# Patient Record
Sex: MALE | Race: WHITE | NOT HISPANIC OR LATINO
[De-identification: names, ages, dates, MRNs, and addresses within clinical notes are randomized per-mention and may not be internally consistent; named-entity substitution may affect disease eponyms.]

---

## 2017-01-27 ENCOUNTER — APPOINTMENT (OUTPATIENT)
Dept: UROLOGY | Facility: CLINIC | Age: 62
End: 2017-01-27

## 2017-01-27 LAB — PSA SERPL-MCNC: 14.91 NG/ML

## 2017-03-16 ENCOUNTER — APPOINTMENT (OUTPATIENT)
Dept: UROLOGY | Facility: CLINIC | Age: 62
End: 2017-03-16

## 2017-03-23 ENCOUNTER — MEDICATION RENEWAL (OUTPATIENT)
Age: 62
End: 2017-03-23

## 2017-07-28 ENCOUNTER — APPOINTMENT (OUTPATIENT)
Dept: UROLOGY | Facility: CLINIC | Age: 62
End: 2017-07-28
Payer: COMMERCIAL

## 2017-07-28 PROCEDURE — 99213 OFFICE O/P EST LOW 20 MIN: CPT

## 2017-07-31 LAB — PSA SERPL-MCNC: 10.88 NG/ML

## 2017-09-14 ENCOUNTER — APPOINTMENT (OUTPATIENT)
Dept: UROLOGY | Facility: CLINIC | Age: 62
End: 2017-09-14

## 2018-01-22 ENCOUNTER — RX RENEWAL (OUTPATIENT)
Age: 63
End: 2018-01-22

## 2018-02-13 ENCOUNTER — APPOINTMENT (OUTPATIENT)
Dept: UROLOGY | Facility: CLINIC | Age: 63
End: 2018-02-13
Payer: COMMERCIAL

## 2018-02-13 PROCEDURE — 99214 OFFICE O/P EST MOD 30 MIN: CPT

## 2018-02-14 LAB — PSA SERPL-MCNC: 7.89 NG/ML

## 2018-02-19 ENCOUNTER — RX RENEWAL (OUTPATIENT)
Age: 63
End: 2018-02-19

## 2018-03-23 ENCOUNTER — RX RENEWAL (OUTPATIENT)
Age: 63
End: 2018-03-23

## 2018-06-18 ENCOUNTER — RX RENEWAL (OUTPATIENT)
Age: 63
End: 2018-06-18

## 2018-09-17 ENCOUNTER — RX RENEWAL (OUTPATIENT)
Age: 63
End: 2018-09-17

## 2018-10-18 ENCOUNTER — RX RENEWAL (OUTPATIENT)
Age: 63
End: 2018-10-18

## 2018-11-09 ENCOUNTER — APPOINTMENT (OUTPATIENT)
Dept: UROLOGY | Facility: CLINIC | Age: 63
End: 2018-11-09
Payer: MEDICARE

## 2018-11-09 PROCEDURE — 99214 OFFICE O/P EST MOD 30 MIN: CPT

## 2018-11-14 ENCOUNTER — MEDICATION RENEWAL (OUTPATIENT)
Age: 63
End: 2018-11-14

## 2018-11-16 LAB — PSA SERPL-MCNC: 8.46 NG/ML

## 2018-11-21 ENCOUNTER — MEDICATION RENEWAL (OUTPATIENT)
Age: 63
End: 2018-11-21

## 2018-11-26 ENCOUNTER — RX RENEWAL (OUTPATIENT)
Age: 63
End: 2018-11-26

## 2018-11-27 ENCOUNTER — MEDICATION RENEWAL (OUTPATIENT)
Age: 63
End: 2018-11-27

## 2019-02-13 ENCOUNTER — RX RENEWAL (OUTPATIENT)
Age: 64
End: 2019-02-13

## 2019-05-15 ENCOUNTER — RX RENEWAL (OUTPATIENT)
Age: 64
End: 2019-05-15

## 2019-06-24 ENCOUNTER — RX RENEWAL (OUTPATIENT)
Age: 64
End: 2019-06-24

## 2019-07-16 ENCOUNTER — NON-APPOINTMENT (OUTPATIENT)
Age: 64
End: 2019-07-16

## 2019-07-16 ENCOUNTER — APPOINTMENT (OUTPATIENT)
Dept: CARDIOLOGY | Facility: CLINIC | Age: 64
End: 2019-07-16
Payer: MEDICARE

## 2019-07-16 VITALS
WEIGHT: 213 LBS | DIASTOLIC BLOOD PRESSURE: 96 MMHG | BODY MASS INDEX: 29.82 KG/M2 | SYSTOLIC BLOOD PRESSURE: 221 MMHG | HEIGHT: 71 IN | OXYGEN SATURATION: 97 % | HEART RATE: 63 BPM

## 2019-07-16 DIAGNOSIS — I38 ENDOCARDITIS, VALVE UNSPECIFIED: ICD-10-CM

## 2019-07-16 PROCEDURE — 99205 OFFICE O/P NEW HI 60 MIN: CPT

## 2019-07-16 PROCEDURE — 93000 ELECTROCARDIOGRAM COMPLETE: CPT

## 2019-07-16 RX ORDER — ASPIRIN 81 MG
81 TABLET, DELAYED RELEASE (ENTERIC COATED) ORAL
Refills: 0 | Status: ACTIVE | COMMUNITY

## 2019-07-16 RX ORDER — METOPROLOL TARTRATE 50 MG/1
50 TABLET, FILM COATED ORAL
Refills: 3 | Status: ACTIVE | COMMUNITY

## 2019-07-16 NOTE — REASON FOR VISIT
[Initial Evaluation] : an initial evaluation of [Hypertension] : hypertension [FreeTextEntry1] : Mitral and tspid valve repair,  aortic insufficiency

## 2019-07-16 NOTE — HISTORY OF PRESENT ILLNESS
[FreeTextEntry1] : I saw Chris Webber in the office today for cardiac evaluation. I had previously seen him in 2008 when he had endocarditis and underwent repair of his mitral and tricuspid valves.  At that time he had no coronary disease. He has done well. He does have hypertension. On recent echocardiogram 7/19 he was found to have normal ejection fraction with good repair of the mitral and tricuspid valves. Trileaflet aortic valve with moderate AI. He had LVH with stage I diastolic dysfunction and dilated left atrium. The patient is asymptomatic. He had a carotid Doppler that showed no plaque.\par \par He has no history of smoking, diabetes, or family history of heart disease. His cholesterol is a little high and his blood pressure is quite variable. Apparently in your office is normal but it can be quite high. It might be white coat syndrome.\par \par He watches his diet fairly carefully and is physically active. He has no chest pain, shortness of breath, palpitation, lightheadedness\par \par ECG shows sinus rhythm with one isolated VPC. The tracing is normal

## 2019-07-16 NOTE — DISCUSSION/SUMMARY
[FreeTextEntry1] : This is a 63-year-old white male who is status post mitral and tricuspid valve repair for endocarditis. His repairs have been successful. He has moderate aortic insufficiency without symptoms. Blood pressure today is quite high but apparently he does have white coat syndrome. On his echo he has LVH with stage I diastolic dysfunction so I suspect his blood pressure is higher than should be.\par \par We did discuss lifestyle including diet, exercise, and weight control. I'm going to start him on olmesartan 10 mg once a day as a vasodilator. The patient will watch his diet more carefully. He does have a home blood pressure machine. Monitor his blood pressure once a day. I will see him in 3 weeks to recheck his pressure as well as to go over his blood pressure readings at home and to check his home machine for accuracy.\par \par This was all discussed with the patient and his wife and answered their questions.\par \par I would appreciate your  sending a copy of his most recent blood work for my review.

## 2019-07-16 NOTE — REVIEW OF SYSTEMS
[Eyeglasses] : currently wearing eyeglasses [Nocturia] : nocturia [Negative] : Gastrointestinal [Fever] : no fever [Headache] : no headache [Chills] : no chills [Feeling Fatigued] : not feeling fatigued [Blurry Vision] : no blurred vision [Joint Pain] : no joint pain [Skin: A Rash] : no rash: [Dizziness] : no dizziness [Depression] : no depression [Anxiety] : no anxiety [Excessive Thirst] : no polydipsia [Easy Bleeding] : no tendency for easy bleeding [Easy Bruising] : no tendency for easy bruising

## 2019-07-16 NOTE — PHYSICAL EXAM
[General Appearance - Well Developed] : well developed [Normal Appearance] : normal appearance [Well Groomed] : well groomed [General Appearance - Well Nourished] : well nourished [No Deformities] : no deformities [General Appearance - In No Acute Distress] : no acute distress [Normal Conjunctiva] : the conjunctiva exhibited no abnormalities [Normal Oral Mucosa] : normal oral mucosa [Normal Jugular Venous A Waves Present] : normal jugular venous A waves present [Normal Jugular Venous V Waves Present] : normal jugular venous V waves present [No Jugular Venous Whalen A Waves] : no jugular venous whalen A waves [Normal Rate] : normal [Normal S1] : normal S1 [Normal S2] : normal S2 [2+] : left 2+ [No Abnormalities] : the abdominal aorta was not enlarged and no bruit was heard [No Pitting Edema] : no pitting edema present [Respiration, Rhythm And Depth] : normal respiratory rhythm and effort [Exaggerated Use Of Accessory Muscles For Inspiration] : no accessory muscle use [Auscultation Breath Sounds / Voice Sounds] : lungs were clear to auscultation bilaterally [Bowel Sounds] : normal bowel sounds [Abdomen Soft] : soft [Abdomen Tenderness] : non-tender [Abnormal Walk] : normal gait [Gait - Sufficient For Exercise Testing] : the gait was sufficient for exercise testing [Nail Clubbing] : no clubbing of the fingernails [Cyanosis, Localized] : no localized cyanosis [Skin Color & Pigmentation] : normal skin color and pigmentation [Skin Turgor] : normal skin turgor [] : no rash [Oriented To Time, Place, And Person] : oriented to person, place, and time [Impaired Insight] : insight and judgment were intact [No Anxiety] : not feeling anxious [II] : a grade 2 [S3] : no S3 [S4] : no S4 [Right Carotid Bruit] : no bruit heard over the right carotid [Left Carotid Bruit] : no bruit heard over the left carotid [Right Femoral Bruit] : no bruit heard over the right femoral artery [Left Femoral Bruit] : no bruit heard over the left femoral artery

## 2019-08-09 ENCOUNTER — RX RENEWAL (OUTPATIENT)
Age: 64
End: 2019-08-09

## 2019-08-20 ENCOUNTER — APPOINTMENT (OUTPATIENT)
Dept: CARDIOLOGY | Facility: CLINIC | Age: 64
End: 2019-08-20
Payer: MEDICARE

## 2019-08-20 VITALS
WEIGHT: 212 LBS | HEIGHT: 71 IN | BODY MASS INDEX: 29.68 KG/M2 | DIASTOLIC BLOOD PRESSURE: 90 MMHG | HEART RATE: 59 BPM | SYSTOLIC BLOOD PRESSURE: 207 MMHG | OXYGEN SATURATION: 98 %

## 2019-08-20 PROCEDURE — 99214 OFFICE O/P EST MOD 30 MIN: CPT

## 2019-08-20 NOTE — REVIEW OF SYSTEMS
[Eyeglasses] : currently wearing eyeglasses [Nocturia] : nocturia [Negative] : Gastrointestinal [Fever] : no fever [Headache] : no headache [Chills] : no chills [Blurry Vision] : no blurred vision [Feeling Fatigued] : not feeling fatigued [Joint Pain] : no joint pain [Skin: A Rash] : no rash: [Depression] : no depression [Dizziness] : no dizziness [Anxiety] : no anxiety [Excessive Thirst] : no polydipsia [Easy Bleeding] : no tendency for easy bleeding [Easy Bruising] : no tendency for easy bruising

## 2019-08-20 NOTE — DISCUSSION/SUMMARY
[FreeTextEntry1] : The patient is feeling well without any signs or symptoms of active heart disease. He discussed his lifestyle including diet, exercise, and weight control. He'll restart the valsartan 80 mg once a day. He does have any problems or symptoms he would call me. His wife is going to start checking his blood pressure regular basis. They going to buy a new machine will bring it on his next visit.\par \par If all is well I will see him in approximately 2 weeks. We did discuss his most recent blood work including his elevated PSA. We also discussed his recent echocardiogram. I answered all their questions.

## 2019-08-20 NOTE — PHYSICAL EXAM
[General Appearance - Well Developed] : well developed [Normal Appearance] : normal appearance [Well Groomed] : well groomed [General Appearance - Well Nourished] : well nourished [No Deformities] : no deformities [General Appearance - In No Acute Distress] : no acute distress [Normal Conjunctiva] : the conjunctiva exhibited no abnormalities [Normal Oral Mucosa] : normal oral mucosa [Normal Jugular Venous A Waves Present] : normal jugular venous A waves present [Normal Jugular Venous V Waves Present] : normal jugular venous V waves present [No Jugular Venous Whalen A Waves] : no jugular venous whalen A waves [Respiration, Rhythm And Depth] : normal respiratory rhythm and effort [Exaggerated Use Of Accessory Muscles For Inspiration] : no accessory muscle use [Auscultation Breath Sounds / Voice Sounds] : lungs were clear to auscultation bilaterally [Bowel Sounds] : normal bowel sounds [Abdomen Soft] : soft [Abdomen Tenderness] : non-tender [Abnormal Walk] : normal gait [Gait - Sufficient For Exercise Testing] : the gait was sufficient for exercise testing [Nail Clubbing] : no clubbing of the fingernails [Cyanosis, Localized] : no localized cyanosis [Skin Color & Pigmentation] : normal skin color and pigmentation [Skin Turgor] : normal skin turgor [] : no rash [Oriented To Time, Place, And Person] : oriented to person, place, and time [Impaired Insight] : insight and judgment were intact [No Anxiety] : not feeling anxious [Normal Rate] : normal [Normal S1] : normal S1 [Normal S2] : normal S2 [II] : a grade 2 [2+] : left 2+ [No Abnormalities] : the abdominal aorta was not enlarged and no bruit was heard [No Pitting Edema] : no pitting edema present [S3] : no S3 [S4] : no S4 [Right Carotid Bruit] : no bruit heard over the right carotid [Left Carotid Bruit] : no bruit heard over the left carotid [Right Femoral Bruit] : no bruit heard over the right femoral artery [Left Femoral Bruit] : no bruit heard over the left femoral artery

## 2019-08-20 NOTE — HISTORY OF PRESENT ILLNESS
[FreeTextEntry1] : I saw Chris Webber in the office today for cardiac follow up. I had previously seen him in 2008 when he had endocarditis and underwent repair of his mitral and tricuspid valves.  At that time he had no coronary disease. He has done well. He does have hypertension. On recent echocardiogram 7/19 he was found to have normal ejection fraction with good repair of the mitral and tricuspid valves. Trileaflet aortic valve with moderate AI. He had LVH with stage I diastolic dysfunction and dilated left atrium. The patient is asymptomatic. He had a carotid Doppler that showed no plaque.\par \par He has no history of smoking, diabetes, or family history of heart disease. His cholesterol is a little high and his blood pressure is quite variable. Apparently in your office is normal but it can be quite high. It might be white coat syndrome.\par \par He watches his diet fairly carefully and is physically active. He has no chest pain, shortness of breath, palpitation, lightheadedness\par \par He is now watching his diet better and starting to walk. He had stopped his valsartan because was feeling dizzy. He thinks this was stress and not the medication once to retry the valsartan 80 mg once a day. His blood pressure at least in the office remains elevated.

## 2019-09-10 ENCOUNTER — APPOINTMENT (OUTPATIENT)
Dept: CARDIOLOGY | Facility: CLINIC | Age: 64
End: 2019-09-10
Payer: MEDICARE

## 2019-09-10 VITALS
HEART RATE: 59 BPM | BODY MASS INDEX: 29.12 KG/M2 | OXYGEN SATURATION: 98 % | WEIGHT: 208 LBS | HEIGHT: 71 IN | SYSTOLIC BLOOD PRESSURE: 200 MMHG | DIASTOLIC BLOOD PRESSURE: 99 MMHG

## 2019-09-10 PROCEDURE — 99214 OFFICE O/P EST MOD 30 MIN: CPT

## 2019-09-10 RX ORDER — TADALAFIL 5 MG/1
5 TABLET, FILM COATED ORAL
Refills: 0 | Status: ACTIVE | COMMUNITY

## 2019-09-10 NOTE — HISTORY OF PRESENT ILLNESS
[FreeTextEntry1] : I saw Chris Webber in the office today for cardiac follow up. I had previously seen him in 2008 when he had endocarditis and underwent repair of his mitral and tricuspid valves.  At that time he had no coronary disease. He has done well. He does have hypertension. On recent echocardiogram 7/19 he was found to have normal ejection fraction with good repair of the mitral and tricuspid valves. Trileaflet aortic valve with moderate AI. He had LVH with stage I diastolic dysfunction and dilated left atrium. The patient is asymptomatic. He had a carotid Doppler that showed no plaque.\par \par He has no history of smoking, diabetes, or family history of heart disease. His cholesterol is a little high and his blood pressure is quite variable. Apparently in your office is normal but it can be quite high. It might be white coat syndrome.\par \par He watches his diet fairly carefully and is physically active. He has no chest pain, shortness of breath, palpitation, lightheadedness\par \par He is now watching his diet better and starting to walk. He had stopped his valsartan because was feeling dizzy. He thinks this was stress and not the medication once to retry the valsartan 80 mg once a day. His blood pressure at least in the office remains elevated.Last visit he restarted the valsartan 80 mg once a day\par \par He is walking 3 miles a day without symptoms and has lost some weight. The monitor his blood pressure home and getting readings in the 130 to 140/60-70 range consistently.

## 2019-09-10 NOTE — DISCUSSION/SUMMARY
[FreeTextEntry1] : The patient is doing well without any signs or symptoms of active heart disease. He has no chest pain, shortness of breath, or palpitation. At home his blood pressure is reasonably well controlled on his present medication. In the office it is still high\par \par He'll monitor his blood pressure when asked to months. If he gets high readings he will let me know. I would see him in 2 months to recheck him and he will bring in his blood pressure machine to check for accuracy.\par \par At this time he'll stay on his present medication. We went all his medications in detail and again discussed the importance of lifestyle.

## 2019-09-10 NOTE — REVIEW OF SYSTEMS
[Eyeglasses] : currently wearing eyeglasses [Nocturia] : nocturia [Negative] : Respiratory [Fever] : no fever [Chills] : no chills [Headache] : no headache [Feeling Fatigued] : not feeling fatigued [Joint Pain] : no joint pain [Blurry Vision] : no blurred vision [Dizziness] : no dizziness [Skin: A Rash] : no rash: [Anxiety] : no anxiety [Depression] : no depression [Excessive Thirst] : no polydipsia [Easy Bleeding] : no tendency for easy bleeding [Easy Bruising] : no tendency for easy bruising

## 2019-09-10 NOTE — PHYSICAL EXAM
[General Appearance - Well Developed] : well developed [Normal Appearance] : normal appearance [Well Groomed] : well groomed [General Appearance - Well Nourished] : well nourished [No Deformities] : no deformities [General Appearance - In No Acute Distress] : no acute distress [Normal Conjunctiva] : the conjunctiva exhibited no abnormalities [Normal Oral Mucosa] : normal oral mucosa [Normal Jugular Venous A Waves Present] : normal jugular venous A waves present [Normal Jugular Venous V Waves Present] : normal jugular venous V waves present [No Jugular Venous Whalen A Waves] : no jugular venous whalen A waves [Respiration, Rhythm And Depth] : normal respiratory rhythm and effort [Auscultation Breath Sounds / Voice Sounds] : lungs were clear to auscultation bilaterally [Exaggerated Use Of Accessory Muscles For Inspiration] : no accessory muscle use [Bowel Sounds] : normal bowel sounds [Abdomen Soft] : soft [Abdomen Tenderness] : non-tender [Abnormal Walk] : normal gait [Gait - Sufficient For Exercise Testing] : the gait was sufficient for exercise testing [Nail Clubbing] : no clubbing of the fingernails [Cyanosis, Localized] : no localized cyanosis [Skin Color & Pigmentation] : normal skin color and pigmentation [] : no rash [Skin Turgor] : normal skin turgor [Oriented To Time, Place, And Person] : oriented to person, place, and time [Impaired Insight] : insight and judgment were intact [No Anxiety] : not feeling anxious [Normal Rate] : normal [Normal S1] : normal S1 [Normal S2] : normal S2 [II] : a grade 2 [2+] : left 2+ [No Abnormalities] : the abdominal aorta was not enlarged and no bruit was heard [No Pitting Edema] : no pitting edema present [S4] : no S4 [S3] : no S3 [Left Carotid Bruit] : no bruit heard over the left carotid [Right Carotid Bruit] : no bruit heard over the right carotid [Right Femoral Bruit] : no bruit heard over the right femoral artery [Left Femoral Bruit] : no bruit heard over the left femoral artery

## 2019-09-23 ENCOUNTER — RX RENEWAL (OUTPATIENT)
Age: 64
End: 2019-09-23

## 2019-10-31 ENCOUNTER — RX RENEWAL (OUTPATIENT)
Age: 64
End: 2019-10-31

## 2019-11-08 ENCOUNTER — APPOINTMENT (OUTPATIENT)
Dept: UROLOGY | Facility: CLINIC | Age: 64
End: 2019-11-08

## 2019-11-08 ENCOUNTER — MEDICATION RENEWAL (OUTPATIENT)
Age: 64
End: 2019-11-08

## 2019-11-12 ENCOUNTER — APPOINTMENT (OUTPATIENT)
Dept: CARDIOLOGY | Facility: CLINIC | Age: 64
End: 2019-11-12
Payer: MEDICARE

## 2019-11-12 VITALS
DIASTOLIC BLOOD PRESSURE: 80 MMHG | SYSTOLIC BLOOD PRESSURE: 191 MMHG | WEIGHT: 215 LBS | BODY MASS INDEX: 30.1 KG/M2 | HEIGHT: 71 IN | HEART RATE: 62 BPM | OXYGEN SATURATION: 100 %

## 2019-11-12 PROCEDURE — 99214 OFFICE O/P EST MOD 30 MIN: CPT

## 2019-11-12 NOTE — REVIEW OF SYSTEMS
[Eyeglasses] : currently wearing eyeglasses [Nocturia] : nocturia [Negative] : Gastrointestinal [Fever] : no fever [Headache] : no headache [Chills] : no chills [Feeling Fatigued] : not feeling fatigued [Joint Pain] : no joint pain [Blurry Vision] : no blurred vision [Skin: A Rash] : no rash: [Depression] : no depression [Dizziness] : no dizziness [Anxiety] : no anxiety [Easy Bruising] : no tendency for easy bruising [Excessive Thirst] : no polydipsia [Easy Bleeding] : no tendency for easy bleeding

## 2019-11-12 NOTE — DISCUSSION/SUMMARY
[FreeTextEntry1] : The patient is feeling well without any signs or symptoms of active heart disease. His gained back some weight and needs to pay attention to this. Blood pressure my office is always high but according to the page in her office, and his home readings are good. He forgot to bring in his machine today.\par \par He'll stay on his present medication. We did discuss lifestyle including diet, exercise, and weight control. He is trying to keep his weight down to about 200 pounds. I would appreciate your sending copy of his most recent bloodwork for my review. If everything is well I will see him in 4 months.

## 2019-11-12 NOTE — PHYSICAL EXAM
[Normal Appearance] : normal appearance [General Appearance - Well Developed] : well developed [Well Groomed] : well groomed [General Appearance - Well Nourished] : well nourished [Normal Conjunctiva] : the conjunctiva exhibited no abnormalities [General Appearance - In No Acute Distress] : no acute distress [No Deformities] : no deformities [Normal Oral Mucosa] : normal oral mucosa [Normal Jugular Venous A Waves Present] : normal jugular venous A waves present [No Jugular Venous Whalen A Waves] : no jugular venous whalen A waves [Normal Jugular Venous V Waves Present] : normal jugular venous V waves present [Respiration, Rhythm And Depth] : normal respiratory rhythm and effort [Exaggerated Use Of Accessory Muscles For Inspiration] : no accessory muscle use [Auscultation Breath Sounds / Voice Sounds] : lungs were clear to auscultation bilaterally [Abdomen Soft] : soft [Bowel Sounds] : normal bowel sounds [Abdomen Tenderness] : non-tender [Abnormal Walk] : normal gait [Gait - Sufficient For Exercise Testing] : the gait was sufficient for exercise testing [Cyanosis, Localized] : no localized cyanosis [Nail Clubbing] : no clubbing of the fingernails [Skin Color & Pigmentation] : normal skin color and pigmentation [Skin Turgor] : normal skin turgor [Oriented To Time, Place, And Person] : oriented to person, place, and time [] : no rash [No Anxiety] : not feeling anxious [Impaired Insight] : insight and judgment were intact [Normal S2] : normal S2 [Normal S1] : normal S1 [Normal Rate] : normal [II] : a grade 2 [2+] : left 2+ [No Pitting Edema] : no pitting edema present [No Abnormalities] : the abdominal aorta was not enlarged and no bruit was heard [S3] : no S3 [Right Carotid Bruit] : no bruit heard over the right carotid [S4] : no S4 [Left Carotid Bruit] : no bruit heard over the left carotid [Right Femoral Bruit] : no bruit heard over the right femoral artery [Left Femoral Bruit] : no bruit heard over the left femoral artery

## 2019-11-12 NOTE — HISTORY OF PRESENT ILLNESS
[FreeTextEntry1] : I saw Chris Webber in the office today for cardiac follow up. I had previously seen him in 2008 when he had endocarditis and underwent repair of his mitral and tricuspid valves.  At that time he had no coronary disease. He has done well. He does have hypertension. On recent echocardiogram 7/19 he was found to have normal ejection fraction with good repair of the mitral and tricuspid valves. Trileaflet aortic valve with moderate AI. He had LVH with stage I diastolic dysfunction and dilated left atrium. The patient is asymptomatic. He had a carotid Doppler that showed no plaque.\par \par He has no history of smoking, diabetes, or family history of heart disease. His cholesterol is a little high and his blood pressure is quite variable. Apparently in your office is normal but it can be quite high. It might be white coat syndrome.\par \par He watches his diet fairly carefully and is physically active. He has no chest pain, shortness of breath, palpitation, lightheadedness\par \par He is now watching his diet better and starting to walk. He had stopped his valsartan because was feeling dizzy. He thinks this was stress and not the medication once to retry the valsartan 80 mg once a day. His blood pressure at least in the office remains elevated.Last visit he restarted the valsartan 80 mg once a day\par \par He is walking 3 miles a day without symptoms and has lost some weight. The monitor his blood pressure home and getting readings in the 130 to 140/60-70 range consistently.His weight unfortunately is not consistent

## 2019-11-14 ENCOUNTER — LABORATORY RESULT (OUTPATIENT)
Age: 64
End: 2019-11-14

## 2019-11-15 ENCOUNTER — APPOINTMENT (OUTPATIENT)
Dept: UROLOGY | Facility: CLINIC | Age: 64
End: 2019-11-15
Payer: MEDICARE

## 2019-11-15 VITALS
DIASTOLIC BLOOD PRESSURE: 83 MMHG | WEIGHT: 215 LBS | HEART RATE: 60 BPM | SYSTOLIC BLOOD PRESSURE: 181 MMHG | HEIGHT: 71 IN | BODY MASS INDEX: 30.1 KG/M2

## 2019-11-15 PROCEDURE — 99214 OFFICE O/P EST MOD 30 MIN: CPT

## 2019-11-15 NOTE — ASSESSMENT
[FreeTextEntry1] : Continue Cialis for ED\par Continue dual therapy for BPH/LUTS\par mpMRI for elevated PSA

## 2019-11-15 NOTE — PHYSICAL EXAM
[General Appearance - Well Nourished] : well nourished [General Appearance - Well Developed] : well developed [Normal Appearance] : normal appearance [Well Groomed] : well groomed [General Appearance - In No Acute Distress] : no acute distress [Abdomen Tenderness] : non-tender [Abdomen Soft] : soft [Costovertebral Angle Tenderness] : no ~M costovertebral angle tenderness [Urethral Meatus] : meatus normal [Urinary Bladder Findings] : the bladder was normal on palpation [Penis Abnormality] : normal circumcised penis [Scrotum] : the scrotum was normal [Testes Tenderness] : no tenderness of the testes [Epididymis] : the epididymides were normal [Testes Mass (___cm)] : there were no testicular masses [Prostate Tenderness] : the prostate was not tender [Edema] : no peripheral edema [No Prostate Nodules] : no prostate nodules [] : no respiratory distress [Respiration, Rhythm And Depth] : normal respiratory rhythm and effort [Oriented To Time, Place, And Person] : oriented to person, place, and time [Exaggerated Use Of Accessory Muscles For Inspiration] : no accessory muscle use [Mood] : the mood was normal [Affect] : the affect was normal [Not Anxious] : not anxious [No Palpable Adenopathy] : no palpable adenopathy

## 2019-11-15 NOTE — HISTORY OF PRESENT ILLNESS
[FreeTextEntry1] : CC: hydrocele, PSA elevation, BPH, ED\par \par History of hydrocele; surgery; no recurrence or issues\par ED on CIalis; works well\par \par BPH/LUTS very well controlled with finasteride and tamsulosin; basically no urinary symptoms.  Alleviated with medication.  No exacerbating factors. \par \par PSA rowena from ~14 to 9.7 with 18 months of Proscar; not down as much as I would like; biopsy negative 2012 and low probability MRI 2016. \par \par \par SOCIAL nonsmoker\par SURG: hydrocele\par FAMHX negative CAP\par ROS: negative CV, RESP, GI

## 2019-11-20 ENCOUNTER — RX RENEWAL (OUTPATIENT)
Age: 64
End: 2019-11-20

## 2019-12-07 ENCOUNTER — FORM ENCOUNTER (OUTPATIENT)
Age: 64
End: 2019-12-07

## 2019-12-08 ENCOUNTER — APPOINTMENT (OUTPATIENT)
Dept: MRI IMAGING | Facility: IMAGING CENTER | Age: 64
End: 2019-12-08
Payer: MEDICARE

## 2019-12-08 ENCOUNTER — OUTPATIENT (OUTPATIENT)
Dept: OUTPATIENT SERVICES | Facility: HOSPITAL | Age: 64
LOS: 1 days | End: 2019-12-08
Payer: MEDICARE

## 2019-12-08 DIAGNOSIS — Z98.89 OTHER SPECIFIED POSTPROCEDURAL STATES: Chronic | ICD-10-CM

## 2019-12-08 DIAGNOSIS — R97.20 ELEVATED PROSTATE SPECIFIC ANTIGEN [PSA]: ICD-10-CM

## 2019-12-08 PROCEDURE — 72197 MRI PELVIS W/O & W/DYE: CPT

## 2019-12-08 PROCEDURE — 72197 MRI PELVIS W/O & W/DYE: CPT | Mod: 26

## 2019-12-18 ENCOUNTER — RX RENEWAL (OUTPATIENT)
Age: 64
End: 2019-12-18

## 2020-02-10 ENCOUNTER — RX RENEWAL (OUTPATIENT)
Age: 65
End: 2020-02-10

## 2020-02-27 ENCOUNTER — RX RENEWAL (OUTPATIENT)
Age: 65
End: 2020-02-27

## 2020-05-21 ENCOUNTER — RX RENEWAL (OUTPATIENT)
Age: 65
End: 2020-05-21

## 2020-05-28 ENCOUNTER — RX RENEWAL (OUTPATIENT)
Age: 65
End: 2020-05-28

## 2020-06-22 ENCOUNTER — APPOINTMENT (OUTPATIENT)
Dept: CARDIOLOGY | Facility: CLINIC | Age: 65
End: 2020-06-22
Payer: MEDICARE

## 2020-06-22 ENCOUNTER — NON-APPOINTMENT (OUTPATIENT)
Age: 65
End: 2020-06-22

## 2020-06-22 VITALS
SYSTOLIC BLOOD PRESSURE: 178 MMHG | HEIGHT: 71 IN | WEIGHT: 210 LBS | DIASTOLIC BLOOD PRESSURE: 91 MMHG | OXYGEN SATURATION: 99 % | BODY MASS INDEX: 29.4 KG/M2 | HEART RATE: 53 BPM

## 2020-06-22 PROCEDURE — 93000 ELECTROCARDIOGRAM COMPLETE: CPT

## 2020-06-22 PROCEDURE — 99214 OFFICE O/P EST MOD 30 MIN: CPT

## 2020-06-22 NOTE — REASON FOR VISIT
[Follow-Up - Clinic] : a clinic follow-up of [Hypertension] : hypertension [FreeTextEntry1] : Mitral and tspid valve repair,  aortic insufficiency

## 2020-06-22 NOTE — HISTORY OF PRESENT ILLNESS
[FreeTextEntry1] : I saw Chris Webber in the office today for cardiac follow up. I had seen him in 2008 when he had endocarditis and underwent repair of his mitral and tricuspid valves.  At that time he had no coronary disease. He has done well. He does have hypertension. On recent echocardiogram 7/19 he was found to have normal ejection fraction with good repair of the mitral and tricuspid valves. Trileaflet aortic valve with moderate AI. He had LVH with stage I diastolic dysfunction and dilated left atrium. The patient is asymptomatic. He had a carotid Doppler 7/19 that showed no plaque.\par \par He has no history of smoking, diabetes, or family history of heart disease. His cholesterol is a little high and his blood pressure is quite variable. Apparently in your office is normal but it can be quite high. It might be white coat syndrome.\par \par He watches his diet fairly carefully and is physically active. He has no chest pain, shortness of breath, palpitation, lightheadedness\par \par He is now watching his diet better and starting to walk. He had stopped his valsartan because was feeling dizzy.. It did not seem to help and he restarted the valsartan 80 mg once a day\par \par He is walking 3 miles a day without symptoms and has lost some weight. The monitor his blood pressure home and getting readings in the 130 to 140/60-70 range consistently.His weight unfortunately is not consistent\par \par ECG shows sinus rhythm and remains normal

## 2020-06-22 NOTE — PHYSICAL EXAM
[Normal Appearance] : normal appearance [General Appearance - Well Developed] : well developed [Well Groomed] : well groomed [General Appearance - Well Nourished] : well nourished [General Appearance - In No Acute Distress] : no acute distress [No Deformities] : no deformities [Normal Oral Mucosa] : normal oral mucosa [Normal Conjunctiva] : the conjunctiva exhibited no abnormalities [Normal Jugular Venous V Waves Present] : normal jugular venous V waves present [No Jugular Venous Whalen A Waves] : no jugular venous whalen A waves [Normal Jugular Venous A Waves Present] : normal jugular venous A waves present [Respiration, Rhythm And Depth] : normal respiratory rhythm and effort [Exaggerated Use Of Accessory Muscles For Inspiration] : no accessory muscle use [Bowel Sounds] : normal bowel sounds [Abdomen Soft] : soft [Auscultation Breath Sounds / Voice Sounds] : lungs were clear to auscultation bilaterally [Abdomen Tenderness] : non-tender [Abnormal Walk] : normal gait [Nail Clubbing] : no clubbing of the fingernails [Gait - Sufficient For Exercise Testing] : the gait was sufficient for exercise testing [Cyanosis, Localized] : no localized cyanosis [Skin Color & Pigmentation] : normal skin color and pigmentation [Skin Turgor] : normal skin turgor [Oriented To Time, Place, And Person] : oriented to person, place, and time [Impaired Insight] : insight and judgment were intact [] : no rash [No Anxiety] : not feeling anxious [Normal Rate] : normal [Normal S1] : normal S1 [Normal S2] : normal S2 [II] : a grade 2 [2+] : right 2+ [No Abnormalities] : the abdominal aorta was not enlarged and no bruit was heard [No Pitting Edema] : no pitting edema present [S3] : no S3 [S4] : no S4 [Left Carotid Bruit] : no bruit heard over the left carotid [Right Carotid Bruit] : no bruit heard over the right carotid [Right Femoral Bruit] : no bruit heard over the right femoral artery [Left Femoral Bruit] : no bruit heard over the left femoral artery

## 2020-06-22 NOTE — REVIEW OF SYSTEMS
[Nocturia] : nocturia [Eyeglasses] : currently wearing eyeglasses [Negative] : Gastrointestinal [Fever] : no fever [Headache] : no headache [Chills] : no chills [Joint Pain] : no joint pain [Feeling Fatigued] : not feeling fatigued [Blurry Vision] : no blurred vision [Dizziness] : no dizziness [Skin: A Rash] : no rash: [Easy Bleeding] : no tendency for easy bleeding [Excessive Thirst] : no polydipsia [Depression] : no depression [Anxiety] : no anxiety [Easy Bruising] : no tendency for easy bruising

## 2020-06-22 NOTE — DISCUSSION/SUMMARY
[FreeTextEntry1] : The patient continues to do well without any signs or symptoms of active heart disease. On his medication his blood pressure is reasonably well controlled. He has no chest pain, shortness of breath or palpitation.\par \par He will stay on his present medication. We did go over his recent carotid Doppler echo and I answered his  questions.  He'll followup echo in August. If all is well I will see him in 6 months.

## 2020-07-20 ENCOUNTER — APPOINTMENT (OUTPATIENT)
Dept: CARDIOLOGY | Facility: CLINIC | Age: 65
End: 2020-07-20
Payer: MEDICARE

## 2020-07-20 PROCEDURE — 93306 TTE W/DOPPLER COMPLETE: CPT

## 2020-07-22 ENCOUNTER — RX RENEWAL (OUTPATIENT)
Age: 65
End: 2020-07-22

## 2020-11-17 ENCOUNTER — RX RENEWAL (OUTPATIENT)
Age: 65
End: 2020-11-17

## 2020-12-14 ENCOUNTER — APPOINTMENT (OUTPATIENT)
Dept: CARDIOLOGY | Facility: CLINIC | Age: 65
End: 2020-12-14
Payer: MEDICARE

## 2020-12-15 ENCOUNTER — APPOINTMENT (OUTPATIENT)
Dept: CARDIOLOGY | Facility: CLINIC | Age: 65
End: 2020-12-15
Payer: MEDICARE

## 2021-01-05 ENCOUNTER — APPOINTMENT (OUTPATIENT)
Dept: CARDIOLOGY | Facility: CLINIC | Age: 66
End: 2021-01-05
Payer: MEDICARE

## 2021-01-05 VITALS
BODY MASS INDEX: 29.54 KG/M2 | HEIGHT: 71 IN | WEIGHT: 211 LBS | HEART RATE: 61 BPM | SYSTOLIC BLOOD PRESSURE: 190 MMHG | DIASTOLIC BLOOD PRESSURE: 84 MMHG | OXYGEN SATURATION: 100 %

## 2021-01-05 PROCEDURE — 99214 OFFICE O/P EST MOD 30 MIN: CPT

## 2021-01-05 NOTE — HISTORY OF PRESENT ILLNESS
[FreeTextEntry1] : I saw Chris Webber in the office today for cardiac follow up. I had seen him in 2008 when he had endocarditis and underwent repair of his mitral and tricuspid valves.  At that time he had no coronary disease. He has done well. He does have hypertension. On recent echocardiogram 7/20 he was found to have normal ejection fraction with good repair of the mitral and tricuspid valves. Trileaflet aortic valve with mild-moderate AI. He had LVH with stage I diastolic dysfunction and dilated left atrium. The patient is asymptomatic. He had a carotid Doppler 7/19 that showed no plaque.\par \par He has no history of smoking, diabetes, or family history of heart disease. His cholesterol is a little high and his blood pressure is quite variable. Apparently in your office is normal but it can be quite high. It might be white coat syndrome.\par \par He watches his diet fairly carefully and is physically active. He has no chest pain, shortness of breath, palpitation, lightheadedness\par \par He is now watching his diet better and starting to walk. He had stopped his valsartan because was feeling dizzy.. It did not seem to help and he restarted the valsartan 80 mg once a day\par \par He is walking 3 miles a day without symptoms and has lost some weight. The monitor his blood pressure home and getting readings in the 130 to 140/60-70 range consistently. Exercises in the gym 4 days a week.

## 2021-01-05 NOTE — DISCUSSION/SUMMARY
[FreeTextEntry1] : The patient continues to do well. Exercises regularly, watches his diet, and monitors his blood pressure at home which has been excellent. He will stay on his present medication.\par \par Did go over his echocardiogram. His aortic valve has a mild to moderate leak. Mitral and tricuspid valve repairs are still doing well. Overall ejection fraction is normal.\par \par If he does have any symptoms he will call me. He will continue to monitor his blood pressure on a regular basis. If his blood pressure does go up he will let me know since blood pressure is important treatment of his valvular heart disease.\par \par We did discuss his lifestyle including exercise, diet, weight control, and I answered his questions.\par \par If all is well I will see him in 6 months at which time he will schedule a followup echocardiogram. If you do blood work I would appreciate a copy for my review.

## 2021-01-05 NOTE — REVIEW OF SYSTEMS
[Fever] : no fever [Headache] : no headache [Chills] : no chills [Feeling Fatigued] : not feeling fatigued [Blurry Vision] : no blurred vision [Joint Pain] : no joint pain [Skin: A Rash] : no rash: [Dizziness] : no dizziness [Depression] : no depression [Anxiety] : no anxiety [Excessive Thirst] : no polydipsia [Easy Bleeding] : no tendency for easy bleeding [Easy Bruising] : no tendency for easy bruising

## 2021-02-17 ENCOUNTER — RX RENEWAL (OUTPATIENT)
Age: 66
End: 2021-02-17

## 2021-03-05 ENCOUNTER — APPOINTMENT (OUTPATIENT)
Dept: UROLOGY | Facility: CLINIC | Age: 66
End: 2021-03-05
Payer: MEDICARE

## 2021-03-05 VITALS
BODY MASS INDEX: 27.9 KG/M2 | TEMPERATURE: 98.5 F | HEIGHT: 72 IN | HEART RATE: 62 BPM | SYSTOLIC BLOOD PRESSURE: 172 MMHG | WEIGHT: 206 LBS | DIASTOLIC BLOOD PRESSURE: 80 MMHG

## 2021-03-05 PROCEDURE — 99214 OFFICE O/P EST MOD 30 MIN: CPT

## 2021-03-05 NOTE — ASSESSMENT
[FreeTextEntry1] : BPH, LUTS\par Continue dual therapy\par \par ED: \par daily Cialis 5mg\par \par \par Elevated PSA\par PSAD is low, very large gland, low risk MRI \par Observe \par \par Follow up in one year

## 2021-03-05 NOTE — HISTORY OF PRESENT ILLNESS
[FreeTextEntry1] : CC: hydrocele, PSA elevation, BPH, ED\par \par History of hydrocelectomy\par No related complaints\par No swelling or pain \par \par ED in past; he is on 5mg of tadalafil daily and no issues with erections for last 2-3 years \par \par BPH/LUTS \par Finasteride and tamsulosin\par Flow is "perfect" \par Lack of full ejaculation is bothersome\par Nocturia maybe 1-2x at times \par \par Elevated PSA history; biopsy negative 2012\par On finasteride\par \par 12/2019     99 grams; PIRAD2\par  \par SOCIAL nonsmoker\par SURG: hydrocele\par FAMHX negative CAP\par ROS: negative CV, RESP, GI

## 2021-03-24 ENCOUNTER — NON-APPOINTMENT (OUTPATIENT)
Age: 66
End: 2021-03-24

## 2021-04-06 ENCOUNTER — RX RENEWAL (OUTPATIENT)
Age: 66
End: 2021-04-06

## 2021-04-13 LAB — PSA SERPL-MCNC: 12.8 NG/ML

## 2021-04-27 ENCOUNTER — OUTPATIENT (OUTPATIENT)
Dept: OUTPATIENT SERVICES | Facility: HOSPITAL | Age: 66
LOS: 1 days | End: 2021-04-27
Payer: MEDICARE

## 2021-04-27 ENCOUNTER — APPOINTMENT (OUTPATIENT)
Dept: MRI IMAGING | Facility: IMAGING CENTER | Age: 66
End: 2021-04-27
Payer: MEDICARE

## 2021-04-27 ENCOUNTER — RESULT REVIEW (OUTPATIENT)
Age: 66
End: 2021-04-27

## 2021-04-27 DIAGNOSIS — Z98.89 OTHER SPECIFIED POSTPROCEDURAL STATES: Chronic | ICD-10-CM

## 2021-04-27 DIAGNOSIS — R97.20 ELEVATED PROSTATE SPECIFIC ANTIGEN [PSA]: ICD-10-CM

## 2021-04-27 DIAGNOSIS — R35.0 FREQUENCY OF MICTURITION: ICD-10-CM

## 2021-04-27 PROCEDURE — 72197 MRI PELVIS W/O & W/DYE: CPT

## 2021-04-27 PROCEDURE — 76377 3D RENDER W/INTRP POSTPROCES: CPT | Mod: 26

## 2021-04-27 PROCEDURE — G1004: CPT

## 2021-04-27 PROCEDURE — A9585: CPT

## 2021-04-27 PROCEDURE — 76377 3D RENDER W/INTRP POSTPROCES: CPT

## 2021-04-27 PROCEDURE — 72197 MRI PELVIS W/O & W/DYE: CPT | Mod: 26,MG

## 2021-05-18 ENCOUNTER — RX RENEWAL (OUTPATIENT)
Age: 66
End: 2021-05-18

## 2021-05-24 ENCOUNTER — RX RENEWAL (OUTPATIENT)
Age: 66
End: 2021-05-24

## 2021-07-06 ENCOUNTER — APPOINTMENT (OUTPATIENT)
Dept: CARDIOLOGY | Facility: CLINIC | Age: 66
End: 2021-07-06
Payer: MEDICARE

## 2021-07-06 ENCOUNTER — NON-APPOINTMENT (OUTPATIENT)
Age: 66
End: 2021-07-06

## 2021-07-06 VITALS
DIASTOLIC BLOOD PRESSURE: 88 MMHG | BODY MASS INDEX: 27.9 KG/M2 | HEIGHT: 72 IN | HEART RATE: 56 BPM | WEIGHT: 206 LBS | SYSTOLIC BLOOD PRESSURE: 185 MMHG | OXYGEN SATURATION: 98 %

## 2021-07-06 PROCEDURE — 93000 ELECTROCARDIOGRAM COMPLETE: CPT

## 2021-07-06 PROCEDURE — 99214 OFFICE O/P EST MOD 30 MIN: CPT

## 2021-07-06 NOTE — REVIEW OF SYSTEMS
[Nocturia] : nocturia [Negative] : Gastrointestinal [Joint Pain] : no joint pain [Rash] : no rash [Dizziness] : no dizziness [Depression] : no depression [Anxiety] : no anxiety [Easy Bleeding] : no tendency for easy bleeding [Easy Bruising] : no tendency for easy bruising

## 2021-07-06 NOTE — DISCUSSION/SUMMARY
[FreeTextEntry1] : The patient is feeling and doing well.  He exercises in the gym and has no chest pain, shortness of breath, palpitation.  He monitors his blood pressure least once or twice a month and gets consistently good readings.  He has good weight control, and eats a healthy diet.\par \beena Went over his blood work from last year, and his medications, I answered his questions.  He was Covid pos 3/21 following his first Pfizer vaccine.  He has not gotten the second shot yet.  Just had severe fatigue from the infection.  Is\par \par Has made a full recovery.\par \par The patient will schedule a regular stress test.  Also schedule follow-up echo towards the end of this year and then make an appointment to see me in 6 months.\par \par If he does have any symptoms or problems he would call me.  He understands the importance of antibiotic prophylaxis when he goes to the dentist.  I would appreciate a copy of his most recent blood work for my review.

## 2021-07-06 NOTE — HISTORY OF PRESENT ILLNESS
[FreeTextEntry1] : I saw Chris Webber in the office today for cardiac follow up. I had seen him in 2008 when he had endocarditis and underwent repair of his mitral and tricuspid valves.  At that time he had no coronary disease. He has done well. He does have hypertension. On  echocardiogram 7/20 he was found to have normal ejection fraction with good repair of the mitral and tricuspid valves. Trileaflet aortic valve with mild-moderate AI. He had LVH with stage I diastolic dysfunction and dilated left atrium. The patient is asymptomatic. He had a carotid Doppler 7/19 that showed no plaque.\par \par He has no history of smoking, diabetes, or family history of heart disease. His cholesterol is a little high and his blood pressure is quite variable. Apparently in your office it is normal but it can be quite high, probably white coat syndrome.\par \par He watches his diet fairly carefully and is physically active. He has no chest pain, shortness of breath, palpitation, lightheadedness\par \par He is now watching his diet better and starting to walk. He had stopped his valsartan because was feeling dizzy.. It did not seem to help and he restarted the valsartan 80 mg once a day\par \par He is walking 3 miles a day without symptoms and has lost some weight. He monitors his blood pressure home and getting readings in the 130 to 140/60-70 range consistently. Exercises in the gym 4 days a week.\par \par ECG demonstrates RSR with first-degree heart block.  No acute change.

## 2021-08-10 ENCOUNTER — RX RENEWAL (OUTPATIENT)
Age: 66
End: 2021-08-10

## 2021-08-26 ENCOUNTER — APPOINTMENT (OUTPATIENT)
Dept: CARDIOLOGY | Facility: CLINIC | Age: 66
End: 2021-08-26
Payer: MEDICARE

## 2021-08-26 PROCEDURE — 93015 CV STRESS TEST SUPVJ I&R: CPT

## 2021-12-01 ENCOUNTER — MED ADMIN CHARGE (OUTPATIENT)
Age: 66
End: 2021-12-01

## 2021-12-01 ENCOUNTER — APPOINTMENT (OUTPATIENT)
Dept: CARDIOLOGY | Facility: CLINIC | Age: 66
End: 2021-12-01
Payer: MEDICARE

## 2021-12-01 PROCEDURE — 93306 TTE W/DOPPLER COMPLETE: CPT

## 2021-12-01 RX ORDER — PERFLUTREN 6.52 MG/ML
6.52 INJECTION, SUSPENSION INTRAVENOUS
Qty: 1 | Refills: 0 | Status: COMPLETED | OUTPATIENT
Start: 2021-12-01

## 2021-12-01 RX ADMIN — PERFLUTREN MG/ML: 6.52 INJECTION, SUSPENSION INTRAVENOUS at 00:00

## 2022-01-12 ENCOUNTER — APPOINTMENT (OUTPATIENT)
Dept: CARDIOLOGY | Facility: CLINIC | Age: 67
End: 2022-01-12
Payer: MEDICARE

## 2022-01-12 VITALS
WEIGHT: 203 LBS | HEART RATE: 61 BPM | DIASTOLIC BLOOD PRESSURE: 71 MMHG | BODY MASS INDEX: 27.5 KG/M2 | SYSTOLIC BLOOD PRESSURE: 158 MMHG | OXYGEN SATURATION: 99 % | HEIGHT: 72 IN

## 2022-01-12 PROCEDURE — 99214 OFFICE O/P EST MOD 30 MIN: CPT

## 2022-01-12 NOTE — HISTORY OF PRESENT ILLNESS
[FreeTextEntry1] : I saw Chris Webber in the office today for cardiac follow up. I had seen him in 2008 when he had endocarditis and underwent repair of his mitral and tricuspid valves.  At that time he had no coronary disease. He has done well. He does have hypertension. On  echocardiogram 7/20 he was found to have normal ejection fraction with good repair of the mitral and tricuspid valves. Trileaflet aortic valve with mild-moderate AI. He had LVH with stage I diastolic dysfunction and dilated left atrium. The patient is asymptomatic. He had a carotid Doppler 7/19 that showed no plaque.\par \par He has no history of smoking, diabetes, or family history of heart disease. His cholesterol is a little high and his blood pressure is quite variable. Apparently in your office it is normal but it can be quite high, probably white coat syndrome.\par \par He watches his diet fairly carefully and is physically active. He has no chest pain, shortness of breath, palpitation, lightheadedness\par \par He is now watching his diet better and starting to walk. He had stopped his valsartan because was feeling dizzy.. It did not seem to help and he restarted the valsartan 80 mg once a day\par \par He is walking 3 miles a day without symptoms and has lost some weight. He monitors his blood pressure home and getting readings in the 130 to 140/60-70 range consistently. Exercises in the gym 4 days a week.\par \par Echocardiogram 12/21 demonstrated ejection fraction of 58%.  There was mitral and tricuspid valve repair.  Mild AI.  Moderate left atrial enlargement.  Stress test 8/21 was normal to a workload of 13 METS.  Blood work 7/21 demonstrated: A cholesterol of 208, triglycerides 77, HDL 63, and .  Hemoglobin 12.4 with hematocrit 36.6.  Normal thyroid.\par

## 2022-01-25 ENCOUNTER — LABORATORY RESULT (OUTPATIENT)
Age: 67
End: 2022-01-25

## 2022-02-02 ENCOUNTER — NON-APPOINTMENT (OUTPATIENT)
Age: 67
End: 2022-02-02

## 2022-02-04 ENCOUNTER — APPOINTMENT (OUTPATIENT)
Dept: UROLOGY | Facility: CLINIC | Age: 67
End: 2022-02-04
Payer: MEDICARE

## 2022-02-04 VITALS
HEART RATE: 50 BPM | TEMPERATURE: 97.9 F | SYSTOLIC BLOOD PRESSURE: 125 MMHG | BODY MASS INDEX: 27.5 KG/M2 | WEIGHT: 203 LBS | DIASTOLIC BLOOD PRESSURE: 72 MMHG | HEIGHT: 72 IN

## 2022-02-04 PROCEDURE — 99214 OFFICE O/P EST MOD 30 MIN: CPT

## 2022-02-04 NOTE — ASSESSMENT
[FreeTextEntry1] : BPH \par Mild LUTS\par Mild ED\par Continue medication\par \par PSA elevated\par Follow up MRI in 4/2022

## 2022-02-04 NOTE — HISTORY OF PRESENT ILLNESS
[FreeTextEntry1] : CC: hydrocele, PSA elevation, BPH, ED\par \par History of hydrocelectomy\par No related complaints\par No recurrence\par \par ED in past\par Doing well with tadalafil \par \par BPH/LUTS \par Finasteride and tamsulosin\par No complaints, flow is strong \par \par Elevated PSA history; biopsy negative 2012\par On finasteride\par PSA 9.89 \par \par 12/2019 99 grams; PIRAD2\par  4/2021  grams PIRAD2\par \par \par SOCIAL nonsmoker\par SURG: hydrocele\par FAMHX negative CAP\par ROS: negative CV, RESP, GI \par

## 2022-02-04 NOTE — PHYSICAL EXAM
[General Appearance - Well Developed] : well developed [General Appearance - Well Nourished] : well nourished [Normal Appearance] : normal appearance [Well Groomed] : well groomed [General Appearance - In No Acute Distress] : no acute distress [Edema] : no peripheral edema [] : no respiratory distress [Respiration, Rhythm And Depth] : normal respiratory rhythm and effort [Exaggerated Use Of Accessory Muscles For Inspiration] : no accessory muscle use [Abdomen Soft] : soft [Abdomen Tenderness] : non-tender [Costovertebral Angle Tenderness] : no ~M costovertebral angle tenderness [Urethral Meatus] : meatus normal [Penis Abnormality] : normal circumcised penis [Urinary Bladder Findings] : the bladder was normal on palpation [Scrotum] : the scrotum was normal [Epididymis] : the epididymides were normal [Testes Tenderness] : no tenderness of the testes [Testes Mass (___cm)] : there were no testicular masses [No Prostate Nodules] : no prostate nodules [Prostate Size ___ gm] : prostate size [unfilled] gm [No Focal Deficits] : no focal deficits [Oriented To Time, Place, And Person] : oriented to person, place, and time [Affect] : the affect was normal [Mood] : the mood was normal [Not Anxious] : not anxious [No Palpable Adenopathy] : no palpable adenopathy

## 2022-02-07 ENCOUNTER — RX RENEWAL (OUTPATIENT)
Age: 67
End: 2022-02-07

## 2022-03-31 ENCOUNTER — RX RENEWAL (OUTPATIENT)
Age: 67
End: 2022-03-31

## 2022-04-28 ENCOUNTER — APPOINTMENT (OUTPATIENT)
Dept: MRI IMAGING | Facility: IMAGING CENTER | Age: 67
End: 2022-04-28
Payer: MEDICARE

## 2022-04-28 ENCOUNTER — OUTPATIENT (OUTPATIENT)
Dept: OUTPATIENT SERVICES | Facility: HOSPITAL | Age: 67
LOS: 1 days | End: 2022-04-28
Payer: MEDICARE

## 2022-04-28 DIAGNOSIS — Z98.89 OTHER SPECIFIED POSTPROCEDURAL STATES: Chronic | ICD-10-CM

## 2022-04-28 DIAGNOSIS — R97.20 ELEVATED PROSTATE SPECIFIC ANTIGEN [PSA]: ICD-10-CM

## 2022-04-28 PROCEDURE — G1004: CPT

## 2022-04-28 PROCEDURE — A9585: CPT

## 2022-04-28 PROCEDURE — 72197 MRI PELVIS W/O & W/DYE: CPT | Mod: ME

## 2022-04-28 PROCEDURE — 72197 MRI PELVIS W/O & W/DYE: CPT | Mod: 26,ME

## 2022-05-02 ENCOUNTER — NON-APPOINTMENT (OUTPATIENT)
Age: 67
End: 2022-05-02

## 2022-05-04 ENCOUNTER — RX RENEWAL (OUTPATIENT)
Age: 67
End: 2022-05-04

## 2022-05-05 ENCOUNTER — APPOINTMENT (OUTPATIENT)
Dept: UROLOGY | Facility: CLINIC | Age: 67
End: 2022-05-05
Payer: MEDICARE

## 2022-05-05 PROCEDURE — 99214 OFFICE O/P EST MOD 30 MIN: CPT

## 2022-05-05 NOTE — ASSESSMENT
[FreeTextEntry1] : FRANKLIN LEAVITT  is a 66 year old  male with a PMHx notable for HTN, endocarditis, valvular insufficiency, and BPH who presents for elevated PSA/prostate fusion biopsy consult today. His PSA has been elevated for many years, ranging from 8.5 ng/ml - 25.6 ng/ml. His PSA in 01/2022 was 19.7 ng/ml- he is on finasteride. His previous prostate MRI's have been normal but his most recent prostate MRI (04/2022) demonstrated a PIRADS 4 lesion to the left pzpm. His PSAD is abnormal (0.19 ng/ml/cc- corrected for finasteride). He had one previous biopsy in 2012, which resulted as benign. He denies a family history of  malignancies. \par \par He understands that many men with prostate cancer will die with the disease rather than of it and we also discussed the results large multi-center American and  prostate cancer screening trials. He also understands that PSA in and of itself does not diagnose prostate cancer but only assesses risk to a certain degree. The patient understands that to definitively screen for prostate cancer, a biopsy is required and this procedure has risks, including bleeding, infection, ED and urinary retention. The patient opted to move forward with the biopsy.\par \par The patient is aware to expect hematuria x 2 weeks and up to 4 weeks of hematospermia.  There is a risk of infection albeit much lower than a transrectal approach. In some cases, patients can experience erectile dysfunction but this is usually self limiting.  Any fever/chills after the biopsy, the patient is to contact the office and go to the ER for an immediate evaluation. He has been given paper instructions outlining these items - which includes medications to avoid prior to surgery.\par \par  \par 1. CBC, BMP, PSA, Covid Test, UA UCx, EKG, Echo reports\par 2. Cardiac Clearance\par 3. TP biopsy at [facility]\par 4. Follow up 2 weeks after biopsy with his primary urologist or ourselves.\par 5. We will call with the path results once they are resulted.\par \par Follow up in office for MRI review/physical exam prior to biopsy date given.\par \par Elsi Ortega NP was present and available for consult for the entirety of this visit.\par

## 2022-05-05 NOTE — PHYSICAL EXAM
[General Appearance - Well Developed] : well developed [General Appearance - Well Nourished] : well nourished [Normal Appearance] : normal appearance [Well Groomed] : well groomed [General Appearance - In No Acute Distress] : no acute distress [Oriented To Time, Place, And Person] : oriented to person, place, and time [Affect] : the affect was normal [Mood] : the mood was normal

## 2022-05-05 NOTE — HISTORY OF PRESENT ILLNESS
[Home] : at home, [unfilled] , at the time of the visit. [Medical Office: ([facility])___] : at the medical office located in  [Spouse] : spouse [Verbal consent obtained from patient] : the patient, [unfilled] [FreeTextEntry1] : Dear Dr. Carey (Urologist)\par Dear Dr. Chivo Ricardo (PCP)\par \par Thank you so much for the referral to help care for your patient.\par \par Chief Complaint: Elevated PSA/ Prostate Fusion Biopsy \par Date of first visit: 05/05/2022\par Occupation: Business owner/ Electrical construction\par \par FRANKLIN LEAVITT  is a 66 year old  male with a PMHx notable for HTN, endocarditis, valvular insufficiency, and BPH who presents for elevated PSA/prostate fusion biopsy consult today. His PSA has been elevated for many years, ranging from 8.5 ng/ml - 25.6 ng/ml. His PSA in 01/2022 was 19.7 ng/ml- he is on finasteride. His previous prostate MRI's have been normal but his most recent prostate MRI (04/2022) demonstrated a PIRADS 4 lesion to the left pzpm. His PSAD is abnormal (0.19 ng/ml/cc- corrected for finasteride). He had one previous biopsy in 2012, which resulted as benign. He denies a family history of  malignancies. \par \par He denies frequency, urgency, and incomplete emptying. He reports some occasional intermittency and nocturia x 3 depending on how much fluids he drinks before bed. Sometimes has coffee before bed. He is on 0.4 mg tamsulosin daily, and happy with that.  \par \par He denies hematuria and dysuria. \par \par PSA History\par 01/2022- 19.78-corrected\par 03/2021- 25.6-corrected\par 11/2019- 19.4-corrected \par 11/2018- 16.8- corrected\par 02/2018- 15.6-corrected\par 07/2017- 21.6 -now on finasteride- corrected value\par 01/2017- 14.91 \par 05/2016- 14.86 \par 02/2016- 14.50\par 04/2015- 11.04\par 09/2014- 10.39 \par 02/2014-  10.73\par 11/2012- 11.01\par 01/2012- 8.59 \par \par MRI History\par MRI 04/28/2022.  101 cc prostate with a PIRADS 4 lesion measuring 11 x 8 x 9 mm to the left pzpm(series 9, image 13).  No LAD. No EPE. No Bony Lesions. The clinical implications discussed with the patient.\par \par MRI 04/27/2022.  103 cc prostate - PIRADS 2 with no MRI suspicious lesions, stable when compared with previous exams. A few small scattered pelvic lymph nodes which are unchanged, but no LAD. No EPE. No bony lesions. \par \par MRI  12/08/2019.  99 cc prostate - PIRADS 2 with no MRI suspicious lesions. No LAD. No EPE. No bony lesions. \par \par MRI 02/12/2016. 104 cc prostate- ESUR score 2 - clinically significant disease is unlikely to be present. No LAD. No EPE. No Bony Lesions. \par \par MRI 02/27/2014. 91 cc prostate - ESUR score 2 - clinically significant disease is unlikely to be present. No LAD. No EPE. No Bony Lesions. \par \par \par Biopsy History \par 02/12/2012 with Dr. Carey- 6/6 cores- benign\par \par The patient denies fevers, chills, nausea and or vomiting and no unexplained weight loss.\par \par All pertinent laboratory results and physician notes were reviewed.  Questionnaire results were discussed with patient.\par \par 05/05/2022\par IPSS 8 QOL 2\par \par \par Prostate cancer screening: the patient and I spoke at length about prostate cancer screening, its risks and its benefits. The patient has 2 (elevated PSA, age) risk factors for prostate cancer.  He understands that many men with prostate cancer will die with the disease rather than of it and we also discussed the results large multi-center American and  prostate cancer screening trials. He also understands that PSA in and of itself does not diagnose prostate cancer but only assesses risk to a certain degree. The patient understands that to definitively screen for prostate cancer, a biopsy is required and this procedure has risks, including bleeding, infection, ED and urinary retention. The patient opted to proceed with a fusion biopsy.\par \par

## 2022-05-17 ENCOUNTER — NON-APPOINTMENT (OUTPATIENT)
Age: 67
End: 2022-05-17

## 2022-05-17 LAB
BASOPHILS # BLD AUTO: 0.07 K/UL
BASOPHILS NFR BLD AUTO: 1.2 %
EOSINOPHIL # BLD AUTO: 0.21 K/UL
EOSINOPHIL NFR BLD AUTO: 3.5 %
HCT VFR BLD CALC: 36.5 %
HGB BLD-MCNC: 11.3 G/DL
IMM GRANULOCYTES NFR BLD AUTO: 0.5 %
LYMPHOCYTES # BLD AUTO: 1 K/UL
LYMPHOCYTES NFR BLD AUTO: 16.7 %
MAN DIFF?: NORMAL
MCHC RBC-ENTMCNC: 25.1 PG
MCHC RBC-ENTMCNC: 31 GM/DL
MCV RBC AUTO: 81.1 FL
MONOCYTES # BLD AUTO: 0.67 K/UL
MONOCYTES NFR BLD AUTO: 11.2 %
NEUTROPHILS # BLD AUTO: 4 K/UL
NEUTROPHILS NFR BLD AUTO: 66.9 %
PLATELET # BLD AUTO: 199 K/UL
RBC # BLD: 4.5 M/UL
RBC # FLD: 14.3 %
WBC # FLD AUTO: 5.98 K/UL

## 2022-05-18 ENCOUNTER — NON-APPOINTMENT (OUTPATIENT)
Age: 67
End: 2022-05-18

## 2022-05-18 DIAGNOSIS — R31.29 OTHER MICROSCOPIC HEMATURIA: ICD-10-CM

## 2022-05-18 LAB
ANION GAP SERPL CALC-SCNC: 13 MMOL/L
APPEARANCE: CLEAR
BACTERIA UR CULT: NORMAL
BACTERIA: NEGATIVE
BILIRUBIN URINE: NEGATIVE
BLOOD URINE: NEGATIVE
BUN SERPL-MCNC: 19 MG/DL
CALCIUM SERPL-MCNC: 9.2 MG/DL
CHLORIDE SERPL-SCNC: 101 MMOL/L
CO2 SERPL-SCNC: 25 MMOL/L
COLOR: YELLOW
CREAT SERPL-MCNC: 1.28 MG/DL
EGFR: 61 ML/MIN/1.73M2
GLUCOSE QUALITATIVE U: NEGATIVE
GLUCOSE SERPL-MCNC: 90 MG/DL
HYALINE CASTS: 4 /LPF
KETONES URINE: NORMAL
LEUKOCYTE ESTERASE URINE: NEGATIVE
MICROSCOPIC-UA: NORMAL
NITRITE URINE: NEGATIVE
PH URINE: 6
POTASSIUM SERPL-SCNC: 3.8 MMOL/L
PROTEIN URINE: ABNORMAL
PSA SERPL-MCNC: 7.87 NG/ML
RED BLOOD CELLS URINE: 5 /HPF
SODIUM SERPL-SCNC: 140 MMOL/L
SPECIFIC GRAVITY URINE: 1.03
SQUAMOUS EPITHELIAL CELLS: 0 /HPF
UROBILINOGEN URINE: ABNORMAL
WHITE BLOOD CELLS URINE: 2 /HPF

## 2022-05-23 ENCOUNTER — APPOINTMENT (OUTPATIENT)
Dept: UROLOGY | Facility: CLINIC | Age: 67
End: 2022-05-23
Payer: MEDICARE

## 2022-05-23 VITALS
HEART RATE: 69 BPM | DIASTOLIC BLOOD PRESSURE: 82 MMHG | TEMPERATURE: 98.5 F | SYSTOLIC BLOOD PRESSURE: 182 MMHG | OXYGEN SATURATION: 98 %

## 2022-05-23 DIAGNOSIS — R97.20 ELEVATED PROSTATE, SPECIFIC ANTIGEN [PSA]: ICD-10-CM

## 2022-05-23 PROCEDURE — 99214 OFFICE O/P EST MOD 30 MIN: CPT

## 2022-05-24 NOTE — ASSESSMENT
[FreeTextEntry1] : FRANKLIN LEAVITT  is a 66 year old  male with a PMHx notable for HTN, endocarditis, valvular insufficiency, and BPH who presents for elevated PSA/prostate fusion biopsy consult today. His PSA has been elevated for many years, ranging from 8.5 ng/ml - 25.6 ng/ml. His PSA in 05/2022 is 15.74 ng/ml(corrected for finasteride). His previous prostate MRI's have been normal but his most recent prostate MRI (04/2022) demonstrated a PIRADS 4 lesion to the left pzpm. His PSAD is abnormal (0.15 ng/ml/cc- corrected for finasteride). He had one previous biopsy in 2012, which resulted as benign. He denies a family history of  malignancies.  \par \par He understands that many men with prostate cancer will die with the disease rather than of it and we also discussed the results large multi-center American and  prostate cancer screening trials. He also understands that PSA in and of itself does not diagnose prostate cancer but only assesses risk to a certain degree. The patient understands that to definitively screen for prostate cancer, a biopsy is required and this procedure has risks, including bleeding, infection, ED and urinary retention. The patient opted to move forward with the biopsy.\par \par The patient is aware to expect hematuria x 2 weeks and up to 4 weeks of hematospermia.  There is a risk of infection albeit much lower than a transrectal approach. In some cases, patients can experience erectile dysfunction but this is usually self limiting.  Any fever/chills after the biopsy, the patient is to contact the office and go to the ER for an immediate evaluation. He has been given paper instructions outlining these items - which includes medications to avoid prior to surgery.\par \par  \par 1. CBC, BMP, PSA, Covid Test, UA UCx, EKG, Echo reports\par 2. Cardiac Clearance\par 3. TP biopsy at [facility]\par 4. Follow up 2 weeks after biopsy with his primary urologist or ourselves.\par 5. We will call with the path results once they are resulted.\par \par Thank you very much for allowing me to assist in the care of this patient. Should you have any additional questions or concerns please do not hesitate to contact me.\par \par \par Sincerely,\par \par \par Tito Damian D.O.\par  of Urology and Radiology\par  of Urology at [facility]\par System Director for Prostate Cancer\par [address] [place] [address], 5th Floor [facility], [address]\par Phone: [phone]\par \par \par

## 2022-05-24 NOTE — ASSESSMENT
[FreeTextEntry1] : FRANKLIN LEAVITT  is a 66 year old  male with a PMHx notable for HTN, endocarditis, valvular insufficiency, and BPH who presents for elevated PSA/prostate fusion biopsy consult today. His PSA has been elevated for many years, ranging from 8.5 ng/ml - 25.6 ng/ml. His PSA in 05/2022 is 15.74 ng/ml(corrected for finasteride). His previous prostate MRI's have been normal but his most recent prostate MRI (04/2022) demonstrated a PIRADS 4 lesion to the left pzpm. His PSAD is abnormal (0.15 ng/ml/cc- corrected for finasteride). He had one previous biopsy in 2012, which resulted as benign. He denies a family history of  malignancies.  \par \par He understands that many men with prostate cancer will die with the disease rather than of it and we also discussed the results large multi-center American and  prostate cancer screening trials. He also understands that PSA in and of itself does not diagnose prostate cancer but only assesses risk to a certain degree. The patient understands that to definitively screen for prostate cancer, a biopsy is required and this procedure has risks, including bleeding, infection, ED and urinary retention. The patient opted to move forward with the biopsy.\par \par The patient is aware to expect hematuria x 2 weeks and up to 4 weeks of hematospermia.  There is a risk of infection albeit much lower than a transrectal approach. In some cases, patients can experience erectile dysfunction but this is usually self limiting.  Any fever/chills after the biopsy, the patient is to contact the office and go to the ER for an immediate evaluation. He has been given paper instructions outlining these items - which includes medications to avoid prior to surgery.\par \par  \par 1. CBC, BMP, PSA, Covid Test, UA UCx, EKG, Echo reports\par 2. Cardiac Clearance\par 3. TP biopsy at [facility]\par 4. Follow up 2 weeks after biopsy with his primary urologist or ourselves.\par 5. We will call with the path results once they are resulted.\par \par Thank you very much for allowing me to assist in the care of this patient. Should you have any additional questions or concerns please do not hesitate to contact me.\par \par \par Sincerely,\par \par \par Tito Damian D.O.\par  of Urology and Radiology\par  of Urology at [facility]\par System Director for Prostate Cancer\par [address] [place] [address], 5th Floor [facility], [address]\par Phone: [phone]\par \par \par

## 2022-05-24 NOTE — ADDENDUM
[FreeTextEntry1] : I, Dr. Damian, personally performed the evaluation and management (E/M) services for this established patient who presents today with (a) new problem(s)/exacerbation of (an) existing condition(s).  That E/M includes conducting the examination, assessing all new/exacerbated conditions, and establishing a new plan of care.  Today, my ACP, Jeannie Ahuja NP, was here to observe my evaluation and management services for this new problem/exacerbated condition to be followed going forward\par \par IRB   MR/TRUS FUSION GUIDED PROSTATE BIOPSY- AN IMPROVED WAY TO DETECT AND QUANTIFY PROSTATE CANCER\par \par Inclusion criteria have been reviewed and it has been determined that the subject has met all inclusion criteria.\par Exclusion criteria have been reviewed and it has been determined that the subject does not meet any exclusion criteria.\par \par The following was discussed during the consent process:\par ·	The fact that the study involves research\par ·	The study schedule and procedures involved\par ·	The main risks of the study, and the fact that all risks may not be known at this time\par ·	New information that may affect the subject’s willingness to continue on the study will be presented as soon as it is available\par ·	Benefits of participating\par ·	Alternatives to participating\par ·	Confidentiality \par ·	Compensation for research-related injury\par ·	Contacts for questions about the study or their rights while on the study\par ·	The fact that the subject’s participation is voluntary - they can refuse or withdraw \par at any time without penalty or loss of benefits.\par \par The subject was given ample time to ask questions prior to signing - all questions were answered to the subject’s satisfaction.\par \par Contact information for research staff was given to the subject.	\par The subject has expressed his/her willingness to participate.	\par \par Opportunity to sign the consent electronically was offered to the subject. Study team to contact the subject to assist with e-consenting though the [employer] platform. \par \par OR  \par \par Subject will sign printed consent on day of procedure.\par

## 2022-05-24 NOTE — ADDENDUM
[FreeTextEntry1] : I, Dr. Damian, personally performed the evaluation and management (E/M) services for this established patient who presents today with (a) new problem(s)/exacerbation of (an) existing condition(s).  That E/M includes conducting the examination, assessing all new/exacerbated conditions, and establishing a new plan of care.  Today, my ACP, Jeannie Ahuja NP, was here to observe my evaluation and management services for this new problem/exacerbated condition to be followed going forward\par \par IRB   MR/TRUS FUSION GUIDED PROSTATE BIOPSY- AN IMPROVED WAY TO DETECT AND QUANTIFY PROSTATE CANCER\par \par Inclusion criteria have been reviewed and it has been determined that the subject has met all inclusion criteria.\par Exclusion criteria have been reviewed and it has been determined that the subject does not meet any exclusion criteria.\par \par The following was discussed during the consent process:\par ·	The fact that the study involves research\par ·	The study schedule and procedures involved\par ·	The main risks of the study, and the fact that all risks may not be known at this time\par ·	New information that may affect the subject’s willingness to continue on the study will be presented as soon as it is available\par ·	Benefits of participating\par ·	Alternatives to participating\par ·	Confidentiality \par ·	Compensation for research-related injury\par ·	Contacts for questions about the study or their rights while on the study\par ·	The fact that the subject’s participation is voluntary - they can refuse or withdraw \par at any time without penalty or loss of benefits.\par \par The subject was given ample time to ask questions prior to signing - all questions were answered to the subject’s satisfaction.\par \par Contact information for research staff was given to the subject.	\par The subject has expressed his/her willingness to participate.	\par \par Opportunity to sign the consent electronically was offered to the subject. Study team to contact the subject to assist with e-consenting though the [employer] platform. \par \par OR  \par \par Subject will sign printed consent on day of procedure.\par

## 2022-05-24 NOTE — PHYSICAL EXAM
[General Appearance - Well Developed] : well developed [General Appearance - Well Nourished] : well nourished [Normal Appearance] : normal appearance [Well Groomed] : well groomed [General Appearance - In No Acute Distress] : no acute distress [Abdomen Soft] : soft [Abdomen Tenderness] : non-tender [Costovertebral Angle Tenderness] : no ~M costovertebral angle tenderness [Urethral Meatus] : meatus normal [Penis Abnormality] : normal circumcised penis [Urinary Bladder Findings] : the bladder was normal on palpation [Scrotum] : the scrotum was normal [Testes Tenderness] : no tenderness of the testes [Testes Mass (___cm)] : there were no testicular masses [Prostate Enlargement] : the prostate was not enlarged [Prostate Tenderness] : the prostate was not tender [No Prostate Nodules] : no prostate nodules [Edema] : no peripheral edema [] : no respiratory distress [Respiration, Rhythm And Depth] : normal respiratory rhythm and effort [Exaggerated Use Of Accessory Muscles For Inspiration] : no accessory muscle use [Oriented To Time, Place, And Person] : oriented to person, place, and time [Affect] : the affect was normal [Mood] : the mood was normal [Not Anxious] : not anxious [Normal Station and Gait] : the gait and station were normal for the patient's age [No Focal Deficits] : no focal deficits [FreeTextEntry1] : negative TYREL 5/23/2022

## 2022-05-24 NOTE — ADDENDUM
[FreeTextEntry1] : I, Dr. Damian, personally performed the evaluation and management (E/M) services for this established patient who presents today with (a) new problem(s)/exacerbation of (an) existing condition(s).  That E/M includes conducting the examination, assessing all new/exacerbated conditions, and establishing a new plan of care.  Today, my ACP, Jeannie Ahuja NP, was here to observe my evaluation and management services for this new problem/exacerbated condition to be followed going forward\par \par IRB   MR/TRUS FUSION GUIDED PROSTATE BIOPSY- AN IMPROVED WAY TO DETECT AND QUANTIFY PROSTATE CANCER\par \par Inclusion criteria have been reviewed and it has been determined that the subject has met all inclusion criteria.\par Exclusion criteria have been reviewed and it has been determined that the subject does not meet any exclusion criteria.\par \par The following was discussed during the consent process:\par ·	The fact that the study involves research\par ·	The study schedule and procedures involved\par ·	The main risks of the study, and the fact that all risks may not be known at this time\par ·	New information that may affect the subject’s willingness to continue on the study will be presented as soon as it is available\par ·	Benefits of participating\par ·	Alternatives to participating\par ·	Confidentiality \par ·	Compensation for research-related injury\par ·	Contacts for questions about the study or their rights while on the study\par ·	The fact that the subject’s participation is voluntary - they can refuse or withdraw \par at any time without penalty or loss of benefits.\par \par The subject was given ample time to ask questions prior to signing - all questions were answered to the subject’s satisfaction.\par \par Contact information for research staff was given to the subject.	\par The subject has expressed his/her willingness to participate.	\par \par Opportunity to sign the consent electronically was offered to the subject. Study team to contact the subject to assist with e-consenting though the [employer] platform. \par \par OR  \par \par Subject will sign printed consent on day of procedure.\par

## 2022-05-24 NOTE — HISTORY OF PRESENT ILLNESS
[FreeTextEntry1] : Dear Dr. Carey (Urologist)\par Dear Dr. Chivo Ricardo (PCP)\par \par Thank you so much for the referral to help care for your patient.\par \par Chief Complaint: Elevated PSA/ Prostate Fusion Biopsy \par Date of first visit: 05/05/2022\par Occupation: Business owner/ Electrical construction\par \par FRANKLIN LEAVITT  is a 66 year old  male with a PMHx notable for HTN, endocarditis, valvular insufficiency, and BPH who presents for elevated PSA/prostate fusion biopsy consult today. His PSA has been elevated for many years, ranging from 8.5 ng/ml - 25.6 ng/ml. His PSA in 05/2022 is 15.74 ng/ml(corrected for finasteride). His previous prostate MRI's have been normal but his most recent prostate MRI (04/2022) demonstrated a PIRADS 4 lesion to the left pzpm. His PSAD is abnormal (0.15 ng/ml/cc- corrected for finasteride). He had one previous biopsy in 2012, which resulted as benign. He denies a family history of  malignancies. \par \par He denies frequency, urgency, and incomplete emptying. He reports some occasional intermittency and nocturia x 3 depending on how much fluids he drinks before bed. Sometimes has coffee before bed. He is on flomax, finasteride, and cialis and happy with that.  \par \par He denies hematuria and dysuria. \par \par PSA History\par 05/05/2022- 15.74- corrected\par 01/2022- 19.78-corrected\par 03/2021- 25.6-corrected\par 11/2019- 19.4-corrected \par 11/2018- 16.8- corrected\par 02/2018- 15.6-corrected\par 07/2017- 21.6 -now on finasteride- corrected value\par 01/2017- 14.91 \par 05/2016- 14.86 \par 02/2016- 14.50\par 04/2015- 11.04\par 09/2014- 10.39 \par 02/2014-  10.73\par 11/2012- 11.01\par 01/2012- 8.59 \par \par MRI History\par MRI 04/28/2022.  101 cc prostate with a PIRADS 4 lesion measuring 11 x 8 x 9 mm to the left pzpm(series 9, image 13).  No LAD. No EPE. No Bony Lesions. The clinical implications discussed with the patient.\par \par MRI 04/27/2021.  103 cc prostate - PIRADS 2 with no MRI suspicious lesions, stable when compared with previous exams. A few small scattered pelvic lymph nodes which are unchanged, but no LAD. No EPE. No bony lesions. \par \par MRI  12/08/2019.  99 cc prostate - PIRADS 2 with no MRI suspicious lesions. No LAD. No EPE. No bony lesions. \par \par MRI 02/12/2016. 104 cc prostate- ESUR score 2 - clinically significant disease is unlikely to be present. No LAD. No EPE. No Bony Lesions. \par \par MRI 02/27/2014. 91 cc prostate - ESUR score 2 - clinically significant disease is unlikely to be present. No LAD. No EPE. No Bony Lesions. \par \par \par Biopsy History \par 02/12/2012 with Dr. Carey- 6/6 cores- benign\par \par The patient denies fevers, chills, nausea and or vomiting and no unexplained weight loss.\par \par All pertinent laboratory results and physician notes were reviewed.  Questionnaire results were discussed with patient.\par \par 05/23/2022\par IPSS 10 QOL 1\par OSCAR 25\par \par 05/05/2022\par IPSS 8 QOL 2\par \par Prostate cancer screening: the patient and I spoke at length about prostate cancer screening, its risks and its benefits. The patient has 2 (elevated PSA, age) risk factors for prostate cancer.  He understands that many men with prostate cancer will die with the disease rather than of it and we also discussed the results large multi-center American and  prostate cancer screening trials. He also understands that PSA in and of itself does not diagnose prostate cancer but only assesses risk to a certain degree. The patient understands that to definitively screen for prostate cancer, a biopsy is required and this procedure has risks, including bleeding, infection, ED and urinary retention. The patient opted to proceed with a fusion biopsy.\par \par I spent 31 minutes of non-face to face time reviewing the patient's records, chart, uploading processing MR images, transferring MR images from PACS to an independent workstation, reviewing images on independent workstation, speaking with their physician and planning their prostate biopsy and preparation of an upcoming visit for 05/23/2022 .  The imaging and planning was highly complex and took this entire time.

## 2022-05-25 ENCOUNTER — NON-APPOINTMENT (OUTPATIENT)
Age: 67
End: 2022-05-25

## 2022-05-25 ENCOUNTER — APPOINTMENT (OUTPATIENT)
Dept: CARDIOLOGY | Facility: CLINIC | Age: 67
End: 2022-05-25
Payer: MEDICARE

## 2022-05-25 VITALS
SYSTOLIC BLOOD PRESSURE: 148 MMHG | HEIGHT: 72 IN | DIASTOLIC BLOOD PRESSURE: 70 MMHG | BODY MASS INDEX: 28.04 KG/M2 | HEART RATE: 59 BPM | OXYGEN SATURATION: 99 % | WEIGHT: 207 LBS

## 2022-05-25 DIAGNOSIS — Z98.890 OTHER SPECIFIED POSTPROCEDURAL STATES: ICD-10-CM

## 2022-05-25 DIAGNOSIS — I35.1 NONRHEUMATIC AORTIC (VALVE) INSUFFICIENCY: ICD-10-CM

## 2022-05-25 PROCEDURE — 93000 ELECTROCARDIOGRAM COMPLETE: CPT

## 2022-05-25 PROCEDURE — 99214 OFFICE O/P EST MOD 30 MIN: CPT

## 2022-05-25 PROCEDURE — 93224 XTRNL ECG REC UP TO 48 HRS: CPT

## 2022-05-26 NOTE — REASON FOR VISIT
[Follow-up Visit ___] : a follow-up visit  for [unfilled] [Follow-Up - Clinic] : a clinic follow-up of [Hypertension] : hypertension [FreeTextEntry1] : Mitral and tspid valve repair,  aortic insufficiency

## 2022-05-26 NOTE — REVIEW OF SYSTEMS
[see HPI] : see HPI [Nocturia] : nocturia [Negative] : Gastrointestinal [Joint Pain] : no joint pain [Rash] : no rash [Dizziness] : no dizziness [Depression] : no depression [Anxiety] : no anxiety [Easy Bleeding] : no tendency for easy bleeding [Easy Bruising] : no tendency for easy bruising

## 2022-05-26 NOTE — HISTORY OF PRESENT ILLNESS
[Home] : at home, [unfilled] , at the time of the visit. [Medical Office: ([facility])___] : at the medical office located in  [Spouse] : spouse [Verbal consent obtained from patient] : the patient, [unfilled] [FreeTextEntry1] : I saw Chris Webber in the office today for cardiac follow up, anticipation of directed prostate fusion biopsy.. I had seen him in 2008 when he had endocarditis and underwent repair of his mitral and tricuspid valves.  At that time he had no coronary disease. He has done well. He does have hypertension. On  echocardiogram 7/20 he was found to have normal ejection fraction with good repair of the mitral and tricuspid valves. Trileaflet aortic valve with mild-moderate AI. He had LVH with stage I diastolic dysfunction and dilated left atrium. The patient is asymptomatic. He had a carotid Doppler 7/19 that showed no plaque.\par \par He has no history of smoking, diabetes, or family history of heart disease. His cholesterol is a little high and his blood pressure is quite variable. Apparently in your office it is normal but it can be quite high, probably white coat syndrome.\par \par He watches his diet fairly carefully and is physically active. He has no chest pain, shortness of breath, palpitation, lightheadedness\par \par He is walking 3 miles a day without symptoms and has lost some weight. He monitors his blood pressure home and getting readings in the 130 to 140/60-70 range consistently. Exercises in the gym 4 days a week.\par \par Echocardiogram 12/21 demonstrated ejection fraction of 58%.  There was mitral and tricuspid valve repair.  Mild AI.  Moderate left atrial enlargement.  Stress test 8/21 was normal to a workload of 13 METS.  Blood work 1/22 demonstrated: a cholesterol of 96, triglycerides 91, HDL 58, and .  Blood work 5/22 demonstrated a hemoglobin of 11.3 with hematocrit 36.5.  Normal BMP.  PSA 7.87.\par \par ECG demonstrates sinus rhythm with ventricular bigeminy.  No other acute changes.\par

## 2022-05-26 NOTE — DISCUSSION/SUMMARY
[FreeTextEntry1] : Clinically the patient is doing well.  He has good functional status and denies any chest pain, shortness of breath, or palpitation.  He is status post mitral and tricuspid valve repair in 2008 for endocarditis.  Recent echocardiogram performed 12/21 demonstrates satisfactory mitral and tricuspid repair with mild AI.  Ejection fraction 58%.  Stress test performed 8/21 was normal to workload of 13 METS.  ECG is stable with ventricular bigeminy.\par \par The patient's cardiac status is stable and he represents a satisfactory candidate for the planned prostate biopsy.  No special precautions other than routine hemodynamic monitoring should be required.  If necessary his aspirin can be held and should be restarted as soon as it is felt to be safe.  It is possible during the procedure he may have ventricular ectopy.  He is on a beta-blocker and is hemodynamically stable.  I anticipate any ventricular ectopy should not cause any problem during the procedure.

## 2022-05-27 ENCOUNTER — OUTPATIENT (OUTPATIENT)
Dept: OUTPATIENT SERVICES | Facility: HOSPITAL | Age: 67
LOS: 1 days | End: 2022-05-27
Payer: MEDICARE

## 2022-05-27 DIAGNOSIS — Z98.89 OTHER SPECIFIED POSTPROCEDURAL STATES: Chronic | ICD-10-CM

## 2022-05-27 DIAGNOSIS — Z00.8 ENCOUNTER FOR OTHER GENERAL EXAMINATION: ICD-10-CM

## 2022-05-27 PROCEDURE — 76377 3D RENDER W/INTRP POSTPROCES: CPT | Mod: 26

## 2022-05-27 PROCEDURE — C8001: CPT

## 2022-05-31 ENCOUNTER — NON-APPOINTMENT (OUTPATIENT)
Age: 67
End: 2022-05-31

## 2022-05-31 LAB — SARS-COV-2 N GENE NPH QL NAA+PROBE: NOT DETECTED

## 2022-06-01 ENCOUNTER — TRANSCRIPTION ENCOUNTER (OUTPATIENT)
Age: 67
End: 2022-06-01

## 2022-06-01 NOTE — ASU DISCHARGE PLAN (ADULT/PEDIATRIC) - ASU DC SPECIAL INSTRUCTIONSFT
You may experience mild pain at the site of the procedure. You can shower, but please keep area dry afterwards and reapply bacitracin and new bandage if needed. You may also experience blood in your urine, stool, and during ejaculation which will clear up in a few days. This is expected. You may take over-the-counter medication such as Tylenol for pain, but do not exceed 4,000 mg of Tylenol daily.

## 2022-06-01 NOTE — ASU PATIENT PROFILE, ADULT - NSICDXPASTSURGICALHX_GEN_ALL_CORE_FT
PAST SURGICAL HISTORY:  H/O mitral valve repair 3/13/2008 @ [facility]    H/O prostate biopsy 2013    Status post repair of hydrocele      PAST SURGICAL HISTORY:  H/[facility]    H/O prostate biopsy 2013    H/O tricuspid valve repair     Status post repair of hydrocele

## 2022-06-01 NOTE — ASU PATIENT PROFILE, ADULT - FALL HARM RISK - UNIVERSAL INTERVENTIONS
Bed in lowest position, wheels locked, appropriate side rails in place/Call bell, personal items and telephone in reach/Instruct patient to call for assistance before getting out of bed or chair/Non-slip footwear when patient is out of bed/[place] to call system/Physically safe environment - no spills, clutter or unnecessary equipment/Purposeful Proactive Rounding/Room/bathroom lighting operational, light cord in reach

## 2022-06-01 NOTE — ASU DISCHARGE PLAN (ADULT/PEDIATRIC) - NS MD DC FALL RISK RISK
For information on Fall & Injury Prevention, visit: [email or web address] OR  [email or web address] OR  https://www.cdc.gov/steadi/patient.html

## 2022-06-01 NOTE — ASU PATIENT PROFILE, ADULT - NSICDXPASTMEDICALHX_GEN_ALL_CORE_FT
PAST MEDICAL HISTORY:  BPH (benign prostatic hyperplasia)     History of mitral valve disease s/p repair 3/13/08    Hydrocele in adult     Hypertension     White coat hypertension as per pt     PAST MEDICAL HISTORY:  AI (aortic insufficiency) 12/2021 ejection fraction 58%    BPH (benign prostatic hyperplasia)     History of mitral valve disease repair 3/13/08    Hypertension     White coat hypertension as per pt

## 2022-06-01 NOTE — ASU DISCHARGE PLAN (ADULT/PEDIATRIC) - CARE PROVIDER_API CALL
James Damian (DO)  Urology  [address], [address] [facility] [address]  Phone: [phone]  Fax: [phone]  Follow Up Time: 2 weeks

## 2022-06-02 ENCOUNTER — OUTPATIENT (OUTPATIENT)
Dept: OUTPATIENT SERVICES | Facility: HOSPITAL | Age: 67
LOS: 1 days | Discharge: HOME | End: 2022-06-02
Payer: MEDICARE

## 2022-06-02 ENCOUNTER — RESULT REVIEW (OUTPATIENT)
Age: 67
End: 2022-06-02

## 2022-06-02 ENCOUNTER — TRANSCRIPTION ENCOUNTER (OUTPATIENT)
Age: 67
End: 2022-06-02

## 2022-06-02 ENCOUNTER — APPOINTMENT (OUTPATIENT)
Dept: UROLOGY | Facility: AMBULATORY SURGERY CENTER | Age: 67
End: 2022-06-02

## 2022-06-02 VITALS
OXYGEN SATURATION: 99 % | HEART RATE: 55 BPM | SYSTOLIC BLOOD PRESSURE: 143 MMHG | RESPIRATION RATE: 16 BRPM | DIASTOLIC BLOOD PRESSURE: 52 MMHG

## 2022-06-02 VITALS
OXYGEN SATURATION: 99 % | DIASTOLIC BLOOD PRESSURE: 58 MMHG | TEMPERATURE: 98 F | RESPIRATION RATE: 16 BRPM | WEIGHT: 202.16 LBS | HEART RATE: 73 BPM | HEIGHT: 72 IN | SYSTOLIC BLOOD PRESSURE: 173 MMHG

## 2022-06-02 DIAGNOSIS — Z98.89 OTHER SPECIFIED POSTPROCEDURAL STATES: Chronic | ICD-10-CM

## 2022-06-02 DIAGNOSIS — I49.3 VENTRICULAR PREMATURE DEPOLARIZATION: ICD-10-CM

## 2022-06-02 DIAGNOSIS — Z98.890 OTHER SPECIFIED POSTPROCEDURAL STATES: Chronic | ICD-10-CM

## 2022-06-02 PROCEDURE — 76377 3D RENDER W/INTRP POSTPROCES: CPT | Mod: 26

## 2022-06-02 PROCEDURE — 55706 BX PRST8 NDL SAT SAMPLING: CPT | Mod: 22

## 2022-06-02 PROCEDURE — 76872 US TRANSRECTAL: CPT | Mod: 26

## 2022-06-02 PROCEDURE — 55706 BX PRST8 NDL SAT SAMPLING: CPT | Mod: AS

## 2022-06-02 PROCEDURE — 88305 TISSUE EXAM BY PATHOLOGIST: CPT | Mod: 26

## 2022-06-02 RX ORDER — SODIUM CHLORIDE 9 MG/ML
1000 INJECTION, SOLUTION INTRAVENOUS
Refills: 0 | Status: DISCONTINUED | OUTPATIENT
Start: 2022-06-02 | End: 2022-06-02

## 2022-06-02 RX ORDER — ACETAMINOPHEN 500 MG
1000 TABLET ORAL ONCE
Refills: 0 | Status: COMPLETED | OUTPATIENT
Start: 2022-06-02 | End: 2022-06-02

## 2022-06-02 RX ORDER — FINASTERIDE 5 MG/1
1 TABLET, FILM COATED ORAL
Qty: 0 | Refills: 0 | DISCHARGE

## 2022-06-02 RX ORDER — TAMSULOSIN HYDROCHLORIDE 0.4 MG/1
1 CAPSULE ORAL
Qty: 0 | Refills: 0 | DISCHARGE

## 2022-06-02 RX ORDER — TADALAFIL 10 MG/1
1 TABLET, FILM COATED ORAL
Qty: 0 | Refills: 0 | DISCHARGE

## 2022-06-02 RX ORDER — INDAPAMIDE 1.25 MG
1 TABLET ORAL
Qty: 0 | Refills: 0 | DISCHARGE

## 2022-06-02 RX ORDER — LOSARTAN POTASSIUM 100 MG/1
1 TABLET, FILM COATED ORAL
Qty: 0 | Refills: 0 | DISCHARGE

## 2022-06-02 RX ORDER — FENTANYL CITRATE 50 UG/ML
25 INJECTION INTRAVENOUS
Refills: 0 | Status: DISCONTINUED | OUTPATIENT
Start: 2022-06-02 | End: 2022-06-02

## 2022-06-02 RX ORDER — ONDANSETRON 8 MG/1
4 TABLET, FILM COATED ORAL ONCE
Refills: 0 | Status: DISCONTINUED | OUTPATIENT
Start: 2022-06-02 | End: 2022-06-02

## 2022-06-02 RX ORDER — ALPRAZOLAM 0.25 MG
1 TABLET ORAL
Qty: 0 | Refills: 0 | DISCHARGE

## 2022-06-02 RX ADMIN — SODIUM CHLORIDE 100 MILLILITER(S): 9 INJECTION, SOLUTION INTRAVENOUS at 13:00

## 2022-06-02 RX ADMIN — Medication 1000 MILLIGRAM(S): at 10:44

## 2022-06-02 NOTE — BRIEF OPERATIVE NOTE - NSICDXBRIEFPROCEDURE_GEN_ALL_CORE_FT
PROCEDURES:  Biopsy of prostate by transperineal approach with integrated magnetic resonance-ultrasound guidance 02-Jun-2022 12:36:29  Karlene Bee

## 2022-06-03 ENCOUNTER — NON-APPOINTMENT (OUTPATIENT)
Age: 67
End: 2022-06-03

## 2022-06-03 PROBLEM — I35.1 NONRHEUMATIC AORTIC (VALVE) INSUFFICIENCY: Chronic | Status: ACTIVE | Noted: 2022-06-02

## 2022-06-07 LAB — SURGICAL PATHOLOGY STUDY: SIGNIFICANT CHANGE UP

## 2022-06-08 ENCOUNTER — NON-APPOINTMENT (OUTPATIENT)
Age: 67
End: 2022-06-08

## 2022-06-09 ENCOUNTER — APPOINTMENT (OUTPATIENT)
Dept: CARDIOLOGY | Facility: CLINIC | Age: 67
End: 2022-06-09
Payer: MEDICARE

## 2022-06-09 PROCEDURE — 93306 TTE W/DOPPLER COMPLETE: CPT

## 2022-06-16 ENCOUNTER — APPOINTMENT (OUTPATIENT)
Dept: CARDIOLOGY | Facility: CLINIC | Age: 67
End: 2022-06-16

## 2022-06-16 ENCOUNTER — APPOINTMENT (OUTPATIENT)
Dept: UROLOGY | Facility: CLINIC | Age: 67
End: 2022-06-16
Payer: MEDICARE

## 2022-06-16 VITALS — DIASTOLIC BLOOD PRESSURE: 75 MMHG | TEMPERATURE: 97.9 F | SYSTOLIC BLOOD PRESSURE: 131 MMHG | HEART RATE: 74 BPM

## 2022-06-16 PROCEDURE — 99213 OFFICE O/P EST LOW 20 MIN: CPT

## 2022-06-16 NOTE — HISTORY OF PRESENT ILLNESS
[FreeTextEntry1] : CC: elevated PSA, ED, BPH \par \par Biopsy negative\par No biopsy complications\par Hematospermia resolved \par Urine clear\par \par Plan for PSA rechecks every 6-12 months\par \par PSA History\par 05/05/2022- 15.74- corrected\par 01/2022- 19.78-corrected\par 03/2021- 25.6-corrected\par 11/2019- 19.4-corrected \par 11/2018- 16.8- corrected\par 02/2018- 15.6-corrected\par 07/2017- 21.6 -now on finasteride- corrected value\par 01/2017- 14.91 \par 05/2016- 14.86 \par 02/2016- 14.50\par 04/2015- 11.04\par 09/2014- 10.39 \par 02/2014- 10.73\par 11/2012- 11.01\par 01/2012- 8.59 \par \par Jaswant Carey MD, FR \par  of Urology [facility]\par Director of Laparoscopic and Robotic Surgery \par [facility] Director of Urology, [facility] \par Professor of Urology\par \par (Office) [phone]\par (Cell)  [phone] \par [email or web address]\par \par \par

## 2022-07-05 ENCOUNTER — RX RENEWAL (OUTPATIENT)
Age: 67
End: 2022-07-05

## 2022-10-31 ENCOUNTER — NON-APPOINTMENT (OUTPATIENT)
Age: 67
End: 2022-10-31

## 2022-10-31 LAB — PSA SERPL-MCNC: 7.48 NG/ML

## 2022-12-19 ENCOUNTER — RX RENEWAL (OUTPATIENT)
Age: 67
End: 2022-12-19

## 2022-12-19 RX ORDER — TAMSULOSIN HYDROCHLORIDE 0.4 MG/1
0.4 CAPSULE ORAL
Qty: 90 | Refills: 3 | Status: ACTIVE | COMMUNITY
Start: 2017-01-27 | End: 1900-01-01

## 2022-12-29 NOTE — ASU PATIENT PROFILE, ADULT - PATIENT'S GENDER IDENTITY
Male

## 2023-04-24 ENCOUNTER — APPOINTMENT (OUTPATIENT)
Dept: CARDIOLOGY | Facility: CLINIC | Age: 68
End: 2023-04-24
Payer: MEDICARE

## 2023-04-24 ENCOUNTER — NON-APPOINTMENT (OUTPATIENT)
Age: 68
End: 2023-04-24

## 2023-04-24 VITALS — DIASTOLIC BLOOD PRESSURE: 82 MMHG | SYSTOLIC BLOOD PRESSURE: 152 MMHG

## 2023-04-24 VITALS
WEIGHT: 200 LBS | SYSTOLIC BLOOD PRESSURE: 197 MMHG | HEART RATE: 56 BPM | OXYGEN SATURATION: 99 % | DIASTOLIC BLOOD PRESSURE: 82 MMHG | HEIGHT: 72 IN | BODY MASS INDEX: 27.09 KG/M2

## 2023-04-24 DIAGNOSIS — E78.5 HYPERLIPIDEMIA, UNSPECIFIED: ICD-10-CM

## 2023-04-24 DIAGNOSIS — I49.3 VENTRICULAR PREMATURE DEPOLARIZATION: ICD-10-CM

## 2023-04-24 PROCEDURE — 93000 ELECTROCARDIOGRAM COMPLETE: CPT

## 2023-04-24 PROCEDURE — 99214 OFFICE O/P EST MOD 30 MIN: CPT

## 2023-04-24 NOTE — HISTORY OF PRESENT ILLNESS
[FreeTextEntry1] : I saw Chris Webber in the office today for cardiac follow up.  He has been under the care of Dr. Webb, and last saw him in May 2022.\par \par In 2008, he had endocarditis and underwent repair of his mitral and tricuspid valves.  At that time he had no coronary disease. On  echocardiogram 7/20 he was found to have normal ejection fraction with good repair of the mitral and tricuspid valves. Trileaflet aortic valve with mild-moderate AI. He had LVH with stage I diastolic dysfunction and dilated left atrium.  This was generally unchanged on an echocardiogram in 2022.\par He had a stress test in 8/2021 which was normal to a workload of 13 METS.\par Blood work in 2022 demonstrated an LDL of 120.\par \par Since last visit, he is doing quite well.\par \par He watches his diet fairly carefully and is physically active. He has no chest pain, shortness of breath, palpitation, or lightheadedness.\par He is walking without symptoms and has lost some weight. He hasn’t been monitoring his BP at home. He does exercise in the gym 4 days a week.\par 24 holter with frequent PVCs, around 15.71% of total beats, in the setting of stress.

## 2023-04-24 NOTE — DISCUSSION/SUMMARY
[FreeTextEntry1] : Clinically the patient is doing well.  He has good functional status and denies any chest pain, shortness of breath, or palpitation.  He is status post mitral and tricuspid valve repair in 2008 for endocarditis.  Recent echocardiogram performed 6/22 demonstrates satisfactory mitral and tricuspid repair with mild AI.   Stress test performed 8/21 was normal to workload of 13 METS. He has only a single PVC on EKG today, which have been stress related in the past.\par \par His BP is elevated, though it seems to all be stress related. He will remain on his current regimen. His most recent LDL was in the 120's.\par \par He will continue to eat right and exercise. I will see him again in 3 months to reevaluate his BP. \par  [EKG obtained to assist in diagnosis and management of assessed problem(s)] : EKG obtained to assist in diagnosis and management of assessed problem(s)

## 2023-05-04 ENCOUNTER — RX RENEWAL (OUTPATIENT)
Age: 68
End: 2023-05-04

## 2023-06-09 LAB — PSA SERPL-MCNC: 6.37 NG/ML

## 2023-06-12 ENCOUNTER — RX RENEWAL (OUTPATIENT)
Age: 68
End: 2023-06-12

## 2023-06-13 ENCOUNTER — APPOINTMENT (OUTPATIENT)
Dept: UROLOGY | Facility: CLINIC | Age: 68
End: 2023-06-13
Payer: MEDICARE

## 2023-06-13 VITALS — SYSTOLIC BLOOD PRESSURE: 165 MMHG | TEMPERATURE: 97.2 F | OXYGEN SATURATION: 98 % | DIASTOLIC BLOOD PRESSURE: 80 MMHG

## 2023-06-13 PROCEDURE — 99214 OFFICE O/P EST MOD 30 MIN: CPT

## 2023-06-13 NOTE — ASSESSMENT
[FreeTextEntry1] : Diagnosis: \par hydrocele \par BPH\par LUTS\par ED\par Elevated PSA with prior negative biopsy\par \par Plan \par ED: continue Cialis \par For BPH/LUTS: continue dual RX\par PSA: PSA elevated secondary to BPH and on decline, not incline will observe\par Annual visit/exam

## 2023-06-13 NOTE — PHYSICAL EXAM
[Urethral Meatus] : meatus normal [Urinary Bladder Findings] : the bladder was normal on palpation [Scrotum] : the scrotum was normal [Testes Mass (___cm)] : there were no testicular masses [No Prostate Nodules] : no prostate nodules [Prostate Size ___ gm] : prostate size [unfilled] gm

## 2023-06-13 NOTE — HISTORY OF PRESENT ILLNESS
[FreeTextEntry1] : CC: History of BPH, hydrocele, PSA elevation, BPH, ED\par \par \par History of hydrocelectomy- doing well, no recurrence\par ED treated effectively withy tadalafil \par \par BPH/LUTS \par Finasteride and tamsulosin; compliant with both\par No changes/complaints \par \par Elevated PSA history; biopsy negative 2012\par 12/2019 99 grams; PIRAD2\par 4/2021  grams PIRAD2\par \par \par SOCIAL nonsmoker\par SURG: hydrocele\par FAMHX negative CAP\par ROS: negative CV, RESP, GI

## 2023-06-15 RX ORDER — FINASTERIDE 5 MG/1
5 TABLET, FILM COATED ORAL
Qty: 90 | Refills: 3 | Status: ACTIVE | COMMUNITY
Start: 2017-01-27 | End: 1900-01-01

## 2023-06-27 ENCOUNTER — RX RENEWAL (OUTPATIENT)
Age: 68
End: 2023-06-27

## 2023-07-24 ENCOUNTER — APPOINTMENT (OUTPATIENT)
Dept: CARDIOLOGY | Facility: CLINIC | Age: 68
End: 2023-07-24
Payer: MEDICARE

## 2023-07-24 ENCOUNTER — NON-APPOINTMENT (OUTPATIENT)
Age: 68
End: 2023-07-24

## 2023-07-24 VITALS — SYSTOLIC BLOOD PRESSURE: 142 MMHG | DIASTOLIC BLOOD PRESSURE: 82 MMHG

## 2023-07-24 VITALS
OXYGEN SATURATION: 100 % | DIASTOLIC BLOOD PRESSURE: 83 MMHG | WEIGHT: 204 LBS | BODY MASS INDEX: 27.63 KG/M2 | HEIGHT: 72 IN | SYSTOLIC BLOOD PRESSURE: 177 MMHG | HEART RATE: 56 BPM

## 2023-07-24 DIAGNOSIS — I35.1 NONRHEUMATIC AORTIC (VALVE) INSUFFICIENCY: ICD-10-CM

## 2023-07-24 PROCEDURE — 99214 OFFICE O/P EST MOD 30 MIN: CPT

## 2023-07-24 PROCEDURE — 93000 ELECTROCARDIOGRAM COMPLETE: CPT

## 2023-07-24 RX ORDER — AMOXICILLIN 500 MG/1
500 CAPSULE ORAL
Qty: 25 | Refills: 0 | Status: ACTIVE | COMMUNITY
Start: 2023-07-13

## 2023-07-24 NOTE — HISTORY OF PRESENT ILLNESS
[FreeTextEntry1] : I saw Chris Webber in the office today for cardiac follow up.  He has been under the care of Dr. Webb, and I last saw him in 4/23.\par \par In 2008, he had endocarditis and underwent repair of his mitral and tricuspid valves.  At that time he had no coronary disease. On  echocardiogram 7/20 he was found to have normal ejection fraction with good repair of the mitral and tricuspid valves. Trileaflet aortic valve with mild-moderate AI. He had LVH with stage I diastolic dysfunction and dilated left atrium.  This was generally unchanged on an echocardiogram in 2022.\par He had a stress test in 8/2021 which was normal to a workload of 13 METS.\par Blood work in 2022 demonstrated an LDL of 120.\par \par Since last visit, he is doing quite well.\par \par He watches his diet fairly carefully and is physically active. He has no chest pain, shortness of breath, palpitation, or lightheadedness.\par He is walking without symptoms and has lost some weight. He hasn’t been monitoring his BP at home. He does exercise in the gym.\par 24 holter with frequent PVCs, around 15.71% of total beats, in the setting of stress.

## 2023-07-24 NOTE — DISCUSSION/SUMMARY
[FreeTextEntry1] : Clinically the patient is doing well.  He reports excellent functional status and denies any chest pain, shortness of breath, or palpitation.  He is status post mitral and tricuspid valve repair in 2008 for endocarditis.  Recent echocardiogram performed 6/22 demonstrates satisfactory mitral and tricuspid repair with mild AI.  Stress test performed 8/21 was normal to workload of 13 METS. He has no PVCs today, or symptoms of palpitations.\par \par His BP is elevated, though it seems to all be stress related. He will remain on his current regimen. His most recent LDL was in the 130's. We will repeat an echocardiogram this year in the setting of PVCs and AI.\par \par He will continue to eat right and exercise. If no issues, I will see him again in 6 months. [EKG obtained to assist in diagnosis and management of assessed problem(s)] : EKG obtained to assist in diagnosis and management of assessed problem(s)

## 2023-07-24 NOTE — PHYSICAL EXAM
[Well Developed] : well developed [Well Nourished] : well nourished [No Acute Distress] : no acute distress [Normal Conjunctiva] : normal conjunctiva [Normal Venous Pressure] : normal venous pressure [No Carotid Bruit] : no carotid bruit [Normal S1, S2] : normal S1, S2 [No Murmur] : no murmur [No Rub] : no rub [Clear Lung Fields] : clear lung fields [No Gallop] : no gallop [Good Air Entry] : good air entry [No Respiratory Distress] : no respiratory distress  [Soft] : abdomen soft [Non Tender] : non-tender [No Masses/organomegaly] : no masses/organomegaly [Normal Bowel Sounds] : normal bowel sounds [Normal Gait] : normal gait [No Edema] : no edema [No Cyanosis] : no cyanosis [No Clubbing] : no clubbing [No Varicosities] : no varicosities [No Skin Lesions] : no skin lesions [No Rash] : no rash [Moves all extremities] : moves all extremities [No Focal Deficits] : no focal deficits [Normal Speech] : normal speech [Alert and Oriented] : alert and oriented [Normal memory] : normal memory

## 2023-08-04 ENCOUNTER — RX RENEWAL (OUTPATIENT)
Age: 68
End: 2023-08-04

## 2023-08-07 ENCOUNTER — APPOINTMENT (OUTPATIENT)
Dept: CARDIOLOGY | Facility: CLINIC | Age: 68
End: 2023-08-07
Payer: MEDICARE

## 2023-08-07 PROCEDURE — 93306 TTE W/DOPPLER COMPLETE: CPT

## 2023-09-08 NOTE — HISTORY OF PRESENT ILLNESS
General [FreeTextEntry1] : Dear Dr. Carey (Urologist)\par Dear Dr. Chivo Ricardo (PCP)\par \par Thank you so much for the referral to help care for your patient.\par \par Chief Complaint: Elevated PSA/ Prostate Fusion Biopsy \par Date of first visit: 05/05/2022\par Occupation: Business owner/ Electrical construction\par \par FRANKLIN LEAVITT  is a 66 year old  male with a PMHx notable for HTN, endocarditis, valvular insufficiency, and BPH who presents for elevated PSA/prostate fusion biopsy consult today. His PSA has been elevated for many years, ranging from 8.5 ng/ml - 25.6 ng/ml. His PSA in 05/2022 is 15.74 ng/ml(corrected for finasteride). His previous prostate MRI's have been normal but his most recent prostate MRI (04/2022) demonstrated a PIRADS 4 lesion to the left pzpm. His PSAD is abnormal (0.15 ng/ml/cc- corrected for finasteride). He had one previous biopsy in 2012, which resulted as benign. He denies a family history of  malignancies. \par \par He denies frequency, urgency, and incomplete emptying. He reports some occasional intermittency and nocturia x 3 depending on how much fluids he drinks before bed. Sometimes has coffee before bed. He is on flomax, finasteride, and cialis and happy with that.  \par \par He denies hematuria and dysuria. \par \par PSA History\par 05/05/2022- 15.74- corrected\par 01/2022- 19.78-corrected\par 03/2021- 25.6-corrected\par 11/2019- 19.4-corrected \par 11/2018- 16.8- corrected\par 02/2018- 15.6-corrected\par 07/2017- 21.6 -now on finasteride- corrected value\par 01/2017- 14.91 \par 05/2016- 14.86 \par 02/2016- 14.50\par 04/2015- 11.04\par 09/2014- 10.39 \par 02/2014-  10.73\par 11/2012- 11.01\par 01/2012- 8.59 \par \par MRI History\par MRI 04/28/2022.  101 cc prostate with a PIRADS 4 lesion measuring 11 x 8 x 9 mm to the left pzpm(series 9, image 13).  No LAD. No EPE. No Bony Lesions. The clinical implications discussed with the patient.\par \par MRI 04/27/2021.  103 cc prostate - PIRADS 2 with no MRI suspicious lesions, stable when compared with previous exams. A few small scattered pelvic lymph nodes which are unchanged, but no LAD. No EPE. No bony lesions. \par \par MRI  12/08/2019.  99 cc prostate - PIRADS 2 with no MRI suspicious lesions. No LAD. No EPE. No bony lesions. \par \par MRI 02/12/2016. 104 cc prostate- ESUR score 2 - clinically significant disease is unlikely to be present. No LAD. No EPE. No Bony Lesions. \par \par MRI 02/27/2014. 91 cc prostate - ESUR score 2 - clinically significant disease is unlikely to be present. No LAD. No EPE. No Bony Lesions. \par \par \par Biopsy History \par 02/12/2012 with Dr. Carey- 6/6 cores- benign\par \par The patient denies fevers, chills, nausea and or vomiting and no unexplained weight loss.\par \par All pertinent laboratory results and physician notes were reviewed.  Questionnaire results were discussed with patient.\par \par 05/23/2022\par IPSS 10 QOL 1\par OSCAR 25\par \par 05/05/2022\par IPSS 8 QOL 2\par \par Prostate cancer screening: the patient and I spoke at length about prostate cancer screening, its risks and its benefits. The patient has 2 (elevated PSA, age) risk factors for prostate cancer.  He understands that many men with prostate cancer will die with the disease rather than of it and we also discussed the results large multi-center American and  prostate cancer screening trials. He also understands that PSA in and of itself does not diagnose prostate cancer but only assesses risk to a certain degree. The patient understands that to definitively screen for prostate cancer, a biopsy is required and this procedure has risks, including bleeding, infection, ED and urinary retention. The patient opted to proceed with a fusion biopsy.\par \par I spent 31 minutes of non-face to face time reviewing the patient's records, chart, uploading processing MR images, transferring MR images from PACS to an independent workstation, reviewing images on independent workstation, speaking with their physician and planning their prostate biopsy and preparation of an upcoming visit for 05/23/2022 .  The imaging and planning was highly complex and took this entire time.

## 2023-11-01 ENCOUNTER — RX RENEWAL (OUTPATIENT)
Age: 68
End: 2023-11-01

## 2023-12-06 ENCOUNTER — APPOINTMENT (OUTPATIENT)
Dept: ORTHOPEDIC SURGERY | Facility: CLINIC | Age: 68
End: 2023-12-06
Payer: COMMERCIAL

## 2023-12-06 VITALS — HEIGHT: 72 IN | BODY MASS INDEX: 27.22 KG/M2 | WEIGHT: 201 LBS

## 2023-12-06 DIAGNOSIS — M43.16 SPONDYLOLISTHESIS, LUMBAR REGION: ICD-10-CM

## 2023-12-06 DIAGNOSIS — M47.22 OTHER SPONDYLOSIS WITH RADICULOPATHY, CERVICAL REGION: ICD-10-CM

## 2023-12-06 DIAGNOSIS — S33.5XXA SPRAIN OF LIGAMENTS OF LUMBAR SPINE, INITIAL ENCOUNTER: ICD-10-CM

## 2023-12-06 PROCEDURE — 99204 OFFICE O/P NEW MOD 45 MIN: CPT

## 2023-12-06 PROCEDURE — 72040 X-RAY EXAM NECK SPINE 2-3 VW: CPT

## 2023-12-06 PROCEDURE — 72100 X-RAY EXAM L-S SPINE 2/3 VWS: CPT

## 2023-12-06 PROCEDURE — 72170 X-RAY EXAM OF PELVIS: CPT

## 2023-12-06 PROCEDURE — 73030 X-RAY EXAM OF SHOULDER: CPT | Mod: RT

## 2023-12-06 RX ORDER — METHYLPREDNISOLONE 4 MG/1
4 TABLET ORAL
Qty: 1 | Refills: 1 | Status: ACTIVE | COMMUNITY
Start: 2023-12-06 | End: 1900-01-01

## 2023-12-06 RX ORDER — ALPRAZOLAM 0.25 MG/1
0.25 TABLET ORAL
Qty: 60 | Refills: 0 | Status: COMPLETED | COMMUNITY
Start: 2022-05-06 | End: 2023-12-06

## 2023-12-07 ENCOUNTER — APPOINTMENT (OUTPATIENT)
Dept: MRI IMAGING | Facility: CLINIC | Age: 68
End: 2023-12-07
Payer: COMMERCIAL

## 2023-12-07 PROCEDURE — 72141 MRI NECK SPINE W/O DYE: CPT

## 2023-12-15 ENCOUNTER — APPOINTMENT (OUTPATIENT)
Dept: ORTHOPEDIC SURGERY | Facility: CLINIC | Age: 68
End: 2023-12-15
Payer: COMMERCIAL

## 2023-12-15 VITALS — BODY MASS INDEX: 27.22 KG/M2 | HEIGHT: 72 IN | WEIGHT: 201 LBS

## 2023-12-15 DIAGNOSIS — S43.421A SPRAIN OF RIGHT ROTATOR CUFF CAPSULE, INITIAL ENCOUNTER: ICD-10-CM

## 2023-12-15 DIAGNOSIS — I51.9 HEART DISEASE, UNSPECIFIED: ICD-10-CM

## 2023-12-15 PROCEDURE — 99214 OFFICE O/P EST MOD 30 MIN: CPT
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
## 2023-12-16 NOTE — PHYSICAL EXAM
[Rotation to left] : rotation to left [Rotation to right] : rotation to right [de-identified] : left lateral flexion 20 degrees [de-identified] : left lateral rotation 60 degrees [de-identified] : right lateral flexion 20 degrees [TWNoteComboBox6] : right lateral rotation 60 degrees [Flexion] : flexion [Extension] : extension [Bending to left] : bending to left [Bending to right] : bending to right [TWNoteComboBox7] : forward flexion 75 degrees [de-identified] : extension 10 degrees [Right] : right shoulder [Standing] : standing [Mild] : mild [] : motor and sensory intact distally [FreeTextEntry9] : tolerates full range with pain terminal endpoin of ff and ir  [de-identified] : 5-/5

## 2023-12-16 NOTE — DISCUSSION/SUMMARY
[de-identified] : General Dx Discussion The patient was advised of the diagnosis. The natural history of the pathology was explained in full to the patient in layman's terms. All questions were answered. The risks and benefits of surgical and non-surgical treatment alternatives were explained in full to the patient.  mri reviewed will get PT f/u 5-6 weeks  poss of further testing/ seeing pain los discussed  questions answered

## 2024-01-27 ENCOUNTER — RX RENEWAL (OUTPATIENT)
Age: 69
End: 2024-01-27

## 2024-02-08 ENCOUNTER — APPOINTMENT (OUTPATIENT)
Dept: ORTHOPEDIC SURGERY | Facility: CLINIC | Age: 69
End: 2024-02-08
Payer: COMMERCIAL

## 2024-02-08 VITALS — BODY MASS INDEX: 27.22 KG/M2 | WEIGHT: 201 LBS | HEIGHT: 72 IN

## 2024-02-08 DIAGNOSIS — M50.20 OTHER CERVICAL DISC DISPLACEMENT, UNSPECIFIED CERVICAL REGION: ICD-10-CM

## 2024-02-08 PROCEDURE — 99214 OFFICE O/P EST MOD 30 MIN: CPT

## 2024-02-08 NOTE — REASON FOR VISIT
[FreeTextEntry2] : follow up - right shoulder, neck, back. No Fault DOA 11/18/23 PT has helped his shoulder and neck  reports cont lower back pain

## 2024-02-08 NOTE — HISTORY OF PRESENT ILLNESS
[Neck] : neck [Lower back] : lower back [5] : 5 [3] : 3 [FreeTextEntry1] : right shoulder [de-identified] : physical therapy

## 2024-02-08 NOTE — DISCUSSION/SUMMARY
[de-identified] : General Dx Discussion The patient was advised of the diagnosis. The natural history of the pathology was explained in full to the patient in layman's terms. All questions were answered. The risks and benefits of surgical and non-surgical treatment alternatives were explained in full to the patient.  will get a mri LOwer back due to cont pain f/u mri  cont PT

## 2024-02-08 NOTE — PHYSICAL EXAM
[de-identified] : left lateral flexion 30 degrees [de-identified] : right lateral flexion 30 degrees [Flexion] : flexion [Extension] : extension [Bending to left] : bending to left [Bending to right] : bending to right [Right] : right shoulder [Standing] : standing [Mild] : mild [5 ___] : forward flexion 5[unfilled]/5 [5___] : internal rotation 5[unfilled]/5 [] : motor and sensory intact distally [FreeTextEntry9] : tolerates full range with pain terminal endpoin of ff and ir  [de-identified] : 5-/5 [TWNoteComboBox7] : active forward flexion 165 degrees [de-identified] : active abduction 160 degrees [TWNoteComboBox6] : internal rotation 70 degrees [de-identified] : external rotation 70 degrees

## 2024-03-28 ENCOUNTER — APPOINTMENT (OUTPATIENT)
Dept: MRI IMAGING | Facility: CLINIC | Age: 69
End: 2024-03-28

## 2024-03-28 ENCOUNTER — APPOINTMENT (OUTPATIENT)
Dept: MRI IMAGING | Facility: CLINIC | Age: 69
End: 2024-03-28
Payer: COMMERCIAL

## 2024-03-28 PROCEDURE — 72148 MRI LUMBAR SPINE W/O DYE: CPT

## 2024-04-04 ENCOUNTER — APPOINTMENT (OUTPATIENT)
Dept: ORTHOPEDIC SURGERY | Facility: CLINIC | Age: 69
End: 2024-04-04
Payer: COMMERCIAL

## 2024-04-04 VITALS — WEIGHT: 202 LBS | BODY MASS INDEX: 27.36 KG/M2 | HEIGHT: 72 IN

## 2024-04-04 DIAGNOSIS — M48.061 SPINAL STENOSIS, LUMBAR REGION WITHOUT NEUROGENIC CLAUDICATION: ICD-10-CM

## 2024-04-04 DIAGNOSIS — M54.12 RADICULOPATHY, CERVICAL REGION: ICD-10-CM

## 2024-04-04 DIAGNOSIS — S33.5XXD SPRAIN OF LIGAMENTS OF LUMBAR SPINE, SUBSEQUENT ENCOUNTER: ICD-10-CM

## 2024-04-04 DIAGNOSIS — S43.421D SPRAIN OF RIGHT ROTATOR CUFF CAPSULE, SUBSEQUENT ENCOUNTER: ICD-10-CM

## 2024-04-04 DIAGNOSIS — S16.1XXD STRAIN OF MUSCLE, FASCIA AND TENDON AT NECK LEVEL, SUBSEQUENT ENCOUNTER: ICD-10-CM

## 2024-04-04 DIAGNOSIS — M51.36 OTHER INTERVERTEBRAL DISC DEGENERATION, LUMBAR REGION: ICD-10-CM

## 2024-04-04 PROCEDURE — 99214 OFFICE O/P EST MOD 30 MIN: CPT

## 2024-04-04 NOTE — DISCUSSION/SUMMARY
[de-identified] : General Dx Discussion The patient was advised of the diagnosis. The natural history of the pathology was explained in full to the patient in layman's terms. All questions were answered. The risks and benefits of surgical and non-surgical treatment alternatives were explained in full to the patient.  mri reviewed will cont PT f/u 6 weeks

## 2024-04-04 NOTE — HISTORY OF PRESENT ILLNESS
[4] : 4 [0] : 0 [Constant] : constant [Full time] : Work status: full time [de-identified] : OJ DOA 11/18/23: Patient is here for MRI results of his back.  [FreeTextEntry1] : Lumbar spine [FreeTextEntry9] : Tylenol [de-identified] : prolonged inactivity [de-identified] : none

## 2024-04-04 NOTE — PHYSICAL EXAM
[de-identified] : left lateral flexion 30 degrees [de-identified] : right lateral flexion 30 degrees [Flexion] : flexion [Extension] : extension [Bending to left] : bending to left [Bending to right] : bending to right [Right] : right shoulder [Standing] : standing [Mild] : mild [5 ___] : forward flexion 5[unfilled]/5 [5___] : internal rotation 5[unfilled]/5 [] : motor and sensory intact distally [FreeTextEntry9] : tolerates full range with pain terminal endpoin of ff and ir  [de-identified] : 5-/5 [TWNoteComboBox7] : active forward flexion 160 degrees [de-identified] : active abduction 160 degrees [TWNoteComboBox6] : internal rotation 70 degrees [de-identified] : external rotation 70 degrees

## 2024-04-05 ENCOUNTER — NON-APPOINTMENT (OUTPATIENT)
Age: 69
End: 2024-04-05

## 2024-04-05 ENCOUNTER — APPOINTMENT (OUTPATIENT)
Dept: CARDIOLOGY | Facility: CLINIC | Age: 69
End: 2024-04-05
Payer: MEDICARE

## 2024-04-05 VITALS
OXYGEN SATURATION: 100 % | SYSTOLIC BLOOD PRESSURE: 157 MMHG | WEIGHT: 205 LBS | DIASTOLIC BLOOD PRESSURE: 70 MMHG | BODY MASS INDEX: 27.77 KG/M2 | HEART RATE: 58 BPM | HEIGHT: 72 IN

## 2024-04-05 VITALS — SYSTOLIC BLOOD PRESSURE: 128 MMHG | DIASTOLIC BLOOD PRESSURE: 80 MMHG

## 2024-04-05 DIAGNOSIS — I10 ESSENTIAL (PRIMARY) HYPERTENSION: ICD-10-CM

## 2024-04-05 DIAGNOSIS — I38 ENDOCARDITIS, VALVE UNSPECIFIED: ICD-10-CM

## 2024-04-05 PROCEDURE — 99214 OFFICE O/P EST MOD 30 MIN: CPT

## 2024-04-05 PROCEDURE — 93000 ELECTROCARDIOGRAM COMPLETE: CPT

## 2024-04-05 NOTE — DISCUSSION/SUMMARY
[EKG obtained to assist in diagnosis and management of assessed problem(s)] : EKG obtained to assist in diagnosis and management of assessed problem(s) [FreeTextEntry1] : Clinically the patient is doing well.  He reports excellent functional status and denies any chest pain, shortness of breath, or palpitation.  He is status post mitral and tricuspid valve repair in 2008 for endocarditis.  Recent echocardiogram performed 8/23 demonstrates satisfactory mitral and tricuspid repair with mild AI.  Stress test performed 8/21 was normal to workload of 13 METS. He has no PVCs today, or symptoms of palpitations.  His BP is elevated, though it seems to all be stress related. He will remain on his current regimen. His most recent LDL was in the 140's.   He will continue to eat right and exercise. If no issues, I will see him again in 6 months. We will repeat an echo and carotid in 8/24.

## 2024-04-05 NOTE — HISTORY OF PRESENT ILLNESS
[FreeTextEntry1] : I saw Chris Webber in the office today for cardiac follow up.  He has been under the care of Dr. Webb, and I last saw him in 7/23.  In 2008, he had endocarditis and underwent repair of his mitral and tricuspid valves.  At that time he had no coronary disease. On  echocardiogram 7/20 he was found to have normal ejection fraction with good repair of the mitral and tricuspid valves. Trileaflet aortic valve with mild-moderate AI. He had LVH with stage I diastolic dysfunction and dilated left atrium.  This was generally unchanged on an echocardiogram in 2022. He had a stress test in 8/2021 which was normal to a workload of 13 METS. Blood work in 2022 demonstrated an LDL of 120.  Since last visit, he is doing quite well. Repeat echo in 8/23 showed normal LV function, mild to mod AI, well functioning TV and MV rings.  He has no chest pain, shortness of breath, palpitation, or lightheadedness. He is walking without symptoms and has lost some weight. He hasn't been monitoring his BP at home. 24 holter with frequent PVCs, around 15.71% of total beats, in the setting of stress. Most recent LDL was 146. Hb 10.6

## 2024-04-23 ENCOUNTER — RX RENEWAL (OUTPATIENT)
Age: 69
End: 2024-04-23

## 2024-04-23 RX ORDER — INDAPAMIDE 1.25 MG/1
1.25 TABLET, FILM COATED ORAL
Qty: 90 | Refills: 0 | Status: ACTIVE | COMMUNITY
Start: 2019-11-20 | End: 1900-01-01

## 2024-04-29 LAB — PSA SERPL-MCNC: 4.77 NG/ML

## 2024-05-07 ENCOUNTER — APPOINTMENT (OUTPATIENT)
Dept: UROLOGY | Facility: CLINIC | Age: 69
End: 2024-05-07
Payer: MEDICARE

## 2024-05-07 VITALS
OXYGEN SATURATION: 100 % | TEMPERATURE: 98 F | SYSTOLIC BLOOD PRESSURE: 155 MMHG | HEART RATE: 68 BPM | DIASTOLIC BLOOD PRESSURE: 78 MMHG

## 2024-05-07 PROCEDURE — 99213 OFFICE O/P EST LOW 20 MIN: CPT

## 2024-05-07 NOTE — HISTORY OF PRESENT ILLNESS
[FreeTextEntry1] : CC: History of BPH, hydrocele, PSA elevation, BPH, ED  History of hydrocelectomy- doing well, no recurrence On exam today, normal appearance, symmetrical, no recurrent hydrocele/swelling   ED treated effectively withy tadalafil, no complaints He is also happy with voiding- no complaints. He is on finasteride and tamsulosin; and daily tadalafil   PSA personally reviewed and independently interpreted, downed to 4.7  Elevated PSA history; biopsy negative 2012 12/2019 99 grams; PIRAD2 4/2021  grams PIRAD2      SOCIAL nonsmoker  SURG: hydrocele  FAMHX negative CAP  ROS: negative CV, RESP, GI

## 2024-05-07 NOTE — ASSESSMENT
[FreeTextEntry1] : Diagnosis:  BPH, hydrocele, PSA elevation, ED  Plan  Continue Rx with finasteride, tamsulosin and tadalafil  Annual PSA/fuv  Jaswant Carey MD, FACS, FRCS  of Urology [facility] Director of Laparoscopic and Robotic Surgery [facility] Director of Urology, [facility] Professor of Urology   (Office) [phone] (Cell)  [phone] [email or web address]  The total amount of time I have personally spent preparing for this visit, reviewing the patient's test results, obtaining external history, ordering tests/medications, documenting clinical information, communicating with and counseling the patient/family and/or caregiver(s), and spent face to face with the patient explaining the above was minutes =20

## 2024-06-13 ENCOUNTER — APPOINTMENT (OUTPATIENT)
Dept: ORTHOPEDIC SURGERY | Facility: CLINIC | Age: 69
End: 2024-06-13

## 2024-06-20 ENCOUNTER — RX RENEWAL (OUTPATIENT)
Age: 69
End: 2024-06-20

## 2024-06-20 RX ORDER — LOSARTAN POTASSIUM 50 MG/1
50 TABLET, FILM COATED ORAL
Qty: 90 | Refills: 3 | Status: ACTIVE | COMMUNITY
Start: 2020-10-17 | End: 1900-01-01

## 2024-07-23 ENCOUNTER — RX RENEWAL (OUTPATIENT)
Age: 69
End: 2024-07-23

## 2024-08-05 ENCOUNTER — APPOINTMENT (OUTPATIENT)
Dept: CARDIOLOGY | Facility: CLINIC | Age: 69
End: 2024-08-05

## 2024-08-05 PROCEDURE — 93880 EXTRACRANIAL BILAT STUDY: CPT

## 2024-08-05 PROCEDURE — 93306 TTE W/DOPPLER COMPLETE: CPT

## 2024-10-08 ENCOUNTER — APPOINTMENT (OUTPATIENT)
Dept: CARDIOLOGY | Facility: CLINIC | Age: 69
End: 2024-10-08
Payer: MEDICARE

## 2024-10-08 ENCOUNTER — NON-APPOINTMENT (OUTPATIENT)
Age: 69
End: 2024-10-08

## 2024-10-08 VITALS — SYSTOLIC BLOOD PRESSURE: 155 MMHG | DIASTOLIC BLOOD PRESSURE: 75 MMHG

## 2024-10-08 VITALS
HEART RATE: 46 BPM | OXYGEN SATURATION: 98 % | DIASTOLIC BLOOD PRESSURE: 79 MMHG | WEIGHT: 194 LBS | BODY MASS INDEX: 26.28 KG/M2 | HEIGHT: 72 IN | SYSTOLIC BLOOD PRESSURE: 178 MMHG

## 2024-10-08 DIAGNOSIS — I38 ENDOCARDITIS, VALVE UNSPECIFIED: ICD-10-CM

## 2024-10-08 DIAGNOSIS — I10 ESSENTIAL (PRIMARY) HYPERTENSION: ICD-10-CM

## 2024-10-08 PROCEDURE — 93000 ELECTROCARDIOGRAM COMPLETE: CPT

## 2024-10-08 PROCEDURE — 99214 OFFICE O/P EST MOD 30 MIN: CPT

## 2025-01-15 ENCOUNTER — RX RENEWAL (OUTPATIENT)
Age: 70
End: 2025-01-15

## 2025-01-23 ENCOUNTER — RX RENEWAL (OUTPATIENT)
Age: 70
End: 2025-01-23

## 2025-03-19 LAB — PSA SERPL-MCNC: 5.91 NG/ML

## 2025-04-08 ENCOUNTER — APPOINTMENT (OUTPATIENT)
Dept: UROLOGY | Facility: CLINIC | Age: 70
End: 2025-04-08

## 2025-04-08 ENCOUNTER — NON-APPOINTMENT (OUTPATIENT)
Age: 70
End: 2025-04-08

## 2025-04-08 ENCOUNTER — APPOINTMENT (OUTPATIENT)
Dept: UROLOGY | Facility: CLINIC | Age: 70
End: 2025-04-08
Payer: MEDICARE

## 2025-04-08 VITALS
OXYGEN SATURATION: 100 % | HEART RATE: 52 BPM | WEIGHT: 194 LBS | HEIGHT: 72 IN | DIASTOLIC BLOOD PRESSURE: 75 MMHG | TEMPERATURE: 97.8 F | BODY MASS INDEX: 26.28 KG/M2 | SYSTOLIC BLOOD PRESSURE: 152 MMHG

## 2025-04-08 PROCEDURE — 99213 OFFICE O/P EST LOW 20 MIN: CPT

## 2025-04-09 ENCOUNTER — APPOINTMENT (OUTPATIENT)
Dept: CARDIOLOGY | Facility: CLINIC | Age: 70
End: 2025-04-09

## (undated) DEVICE — BALLOON ENDOCAVITY 2X14CM

## (undated) DEVICE — SYR CATH TIP 2 OZ

## (undated) DEVICE — GLV 8 PROTEXIS (WHITE)

## (undated) DEVICE — NDL HYPO REGULAR BEVEL 25G X 1.5" (BLUE)

## (undated) DEVICE — SYR LUER LOK 10CC

## (undated) DEVICE — DRSG TELFA 3 X 8

## (undated) DEVICE — NDL BIOPSY CHIBA 22G X 20CM

## (undated) DEVICE — PREP CHLORAPREP HI-LITE ORANGE 26ML

## (undated) DEVICE — DRAPE TOWEL BLUE 17" X 24"

## (undated) DEVICE — GRID BRACHYTHERAPY EZ 18G

## (undated) DEVICE — SYR LUER LOK 50CC

## (undated) DEVICE — NDL MAX CORE 18G X 25CM

## (undated) DEVICE — DRAPE MEDIUM SHEET 44" X 70"

## (undated) DEVICE — PLASTIC SOLUTION BOWL 160Z